# Patient Record
Sex: FEMALE | Race: WHITE | NOT HISPANIC OR LATINO | Employment: OTHER | URBAN - NONMETROPOLITAN AREA
[De-identification: names, ages, dates, MRNs, and addresses within clinical notes are randomized per-mention and may not be internally consistent; named-entity substitution may affect disease eponyms.]

---

## 2019-05-21 ENCOUNTER — OFFICE VISIT (OUTPATIENT)
Dept: CARDIOLOGY | Facility: CLINIC | Age: 79
End: 2019-05-21

## 2019-05-21 ENCOUNTER — LAB (OUTPATIENT)
Dept: LAB | Facility: HOSPITAL | Age: 79
End: 2019-05-21

## 2019-05-21 VITALS
BODY MASS INDEX: 24.56 KG/M2 | SYSTOLIC BLOOD PRESSURE: 135 MMHG | HEART RATE: 60 BPM | HEIGHT: 63 IN | DIASTOLIC BLOOD PRESSURE: 66 MMHG | OXYGEN SATURATION: 98 % | WEIGHT: 138.6 LBS

## 2019-05-21 DIAGNOSIS — R06.02 SHORTNESS OF BREATH: ICD-10-CM

## 2019-05-21 DIAGNOSIS — Z87.891 FORMER SMOKER: ICD-10-CM

## 2019-05-21 DIAGNOSIS — R07.2 PRECORDIAL PAIN: ICD-10-CM

## 2019-05-21 DIAGNOSIS — I47.29 NSVT (NONSUSTAINED VENTRICULAR TACHYCARDIA) (HCC): ICD-10-CM

## 2019-05-21 DIAGNOSIS — I10 ESSENTIAL HYPERTENSION: Primary | ICD-10-CM

## 2019-05-21 DIAGNOSIS — E78.2 MIXED HYPERLIPIDEMIA: ICD-10-CM

## 2019-05-21 DIAGNOSIS — R00.2 PALPITATIONS: ICD-10-CM

## 2019-05-21 DIAGNOSIS — R94.39 ABNORMAL NUCLEAR STRESS TEST: ICD-10-CM

## 2019-05-21 PROBLEM — K57.92 DIVERTICULITIS: Status: ACTIVE | Noted: 2019-05-21

## 2019-05-21 PROCEDURE — 85379 FIBRIN DEGRADATION QUANT: CPT | Performed by: INTERNAL MEDICINE

## 2019-05-21 PROCEDURE — 93000 ELECTROCARDIOGRAM COMPLETE: CPT | Performed by: INTERNAL MEDICINE

## 2019-05-21 PROCEDURE — 99204 OFFICE O/P NEW MOD 45 MIN: CPT | Performed by: INTERNAL MEDICINE

## 2019-05-21 PROCEDURE — 99406 BEHAV CHNG SMOKING 3-10 MIN: CPT | Performed by: INTERNAL MEDICINE

## 2019-05-21 PROCEDURE — 36415 COLL VENOUS BLD VENIPUNCTURE: CPT

## 2019-05-21 RX ORDER — TRIAMTERENE AND HYDROCHLOROTHIAZIDE 37.5; 25 MG/1; MG/1
1 TABLET ORAL DAILY
COMMUNITY
End: 2021-07-08

## 2019-05-21 RX ORDER — ASPIRIN 81 MG/1
81 TABLET ORAL DAILY
COMMUNITY
End: 2020-11-02

## 2019-05-21 RX ORDER — ATORVASTATIN CALCIUM 20 MG/1
20 TABLET, FILM COATED ORAL NIGHTLY
COMMUNITY

## 2019-05-21 RX ORDER — NITROGLYCERIN 0.4 MG/1
TABLET SUBLINGUAL
Qty: 25 TABLET | Refills: 2 | Status: SHIPPED | OUTPATIENT
Start: 2019-05-21 | End: 2021-05-03 | Stop reason: SDUPTHER

## 2019-05-21 NOTE — PATIENT INSTRUCTIONS
Steps to Quit Smoking  Smoking tobacco can be bad for your health. It can also affect almost every organ in your body. Smoking puts you and people around you at risk for many serious long-lasting (chronic) diseases. Quitting smoking is hard, but it is one of the best things that you can do for your health. It is never too late to quit.  What are the benefits of quitting smoking?  When you quit smoking, you lower your risk for getting serious diseases and conditions. They can include:  · Lung cancer or lung disease.  · Heart disease.  · Stroke.  · Heart attack.  · Not being able to have children (infertility).  · Weak bones (osteoporosis) and broken bones (fractures).    If you have coughing, wheezing, and shortness of breath, those symptoms may get better when you quit. You may also get sick less often. If you are pregnant, quitting smoking can help to lower your chances of having a baby of low birth weight.  What can I do to help me quit smoking?  Talk with your doctor about what can help you quit smoking. Some things you can do (strategies) include:  · Quitting smoking totally, instead of slowly cutting back how much you smoke over a period of time.  · Going to in-person counseling. You are more likely to quit if you go to many counseling sessions.  · Using resources and support systems, such as:  ? Online chats with a counselor.  ? Phone quitlines.  ? Printed self-help materials.  ? Support groups or group counseling.  ? Text messaging programs.  ? Mobile phone apps or applications.  · Taking medicines. Some of these medicines may have nicotine in them. If you are pregnant or breastfeeding, do not take any medicines to quit smoking unless your doctor says it is okay. Talk with your doctor about counseling or other things that can help you.    Talk with your doctor about using more than one strategy at the same time, such as taking medicines while you are also going to in-person counseling. This can help make  quitting easier.  What things can I do to make it easier to quit?  Quitting smoking might feel very hard at first, but there is a lot that you can do to make it easier. Take these steps:  · Talk to your family and friends. Ask them to support and encourage you.  · Call phone quitlines, reach out to support groups, or work with a counselor.  · Ask people who smoke to not smoke around you.  · Avoid places that make you want (trigger) to smoke, such as:  ? Bars.  ? Parties.  ? Smoke-break areas at work.  · Spend time with people who do not smoke.  · Lower the stress in your life. Stress can make you want to smoke. Try these things to help your stress:  ? Getting regular exercise.  ? Deep-breathing exercises.  ? Yoga.  ? Meditating.  ? Doing a body scan. To do this, close your eyes, focus on one area of your body at a time from head to toe, and notice which parts of your body are tense. Try to relax the muscles in those areas.  · Download or buy apps on your mobile phone or tablet that can help you stick to your quit plan. There are many free apps, such as QuitGuide from the CDC (Centers for Disease Control and Prevention). You can find more support from smokefree.gov and other websites.    This information is not intended to replace advice given to you by your health care provider. Make sure you discuss any questions you have with your health care provider.  Document Released: 10/14/2010 Document Revised: 08/15/2017 Document Reviewed: 05/03/2016  [employer] Interactive Patient Education © 2019 [employer]

## 2019-05-21 NOTE — PROGRESS NOTES
Subjective   Kaye Cano is a 78 y.o. female     Chief Complaint   Patient presents with   • Establish Care   • Chest Pain   • Shortness of Breath   • Palpitations       PROBLEM LIST:     1. Chest Pain  2. Shortness of breath  3. Palpitations  4. HTN  5. Hyperlipidemia  6. Diverticulitis  7. Former Smoker        Specialty Problems     None            HPI:  Ms. Kaye Cano is an 78-year-old female patient of [facility] who was seen today for evaluation of chest discomfort.    Ms. Cano describes chest pain of approximately 1 years duration.  She has a retrosternal and right greater than left precordial chest discomfort.  This sometimes starts in the right intrascapular area.  Chest discomfort usually occurs when the patient is lying down in the evening.  However, she has had chest discomfort in the morning and occasionally throughout the day.  Tightness lasted approximately 10 minutes, is associated with shortness of breath and rarely with diaphoresis occurs several times a month, and has not been progressive in frequency, intensity, or duration.  The patient states that she never has exertional chest discomfort.  As an example she describes push mowing her lawn and cleaning her gutters this weekend without shortness of breath or any type of chest discomfort.    Ms. Cano also denies orthopnea, PND, or lower extremity edema.  She has episodes of short-lived palpitations which are not temporally associated with chest discomfort.  Palpitations never cause lightheadedness, dizziness, or shortness of breath, have been present for years, and have not been progressive in nature.  The patient denies claudication or other symptoms of peripheral arterial disease she has no symptoms of arterial embolic events to include no symptoms of TIA or stroke.  The patient states that she had cardiac catheterization in the distant past and was told she had no coronary artery disease.                  CURRENT  MEDICATION:    Current Outpatient Medications   Medication Sig Dispense Refill   • aspirin 81 MG EC tablet Take 81 mg by mouth Daily.     • atorvastatin (LIPITOR) 10 MG tablet Take 10 mg by mouth Every Night.     • Misc Natural Products (OSTEO BI-FLEX JOINT SHIELD PO) Take  by mouth 2 (Two) Times a Day.     • triamterene-hydrochlorothiazide (MAXZIDE-25) 37.5-25 MG per tablet Take 1 tablet by mouth Daily.       No current facility-administered medications for this visit.        ALLERGIES:    Codeine    PAST MEDICAL HISTORY:    Past Medical History:   Diagnosis Date   • Hyperlipidemia        SURGICAL HISTORY:    Past Surgical History:   Procedure Laterality Date   • APPENDECTOMY     • BREAST LUMPECTOMY      Right    • CARDIAC CATHETERIZATION      10-12 years ago. No stents        SOCIAL HISTORY:    Social History     Socioeconomic History   • Marital status:      Spouse name: Not on file   • Number of children: Not on file   • Years of education: Not on file   • Highest education level: Not on file   Tobacco Use   • Smoking status: Former Smoker     Packs/day: 1.00     Years: 40.00     Pack years: 40.00     Types: Electronic Cigarette     Last attempt to quit: 2018     Years since quittin.8   • Smokeless tobacco: Never Used   • Tobacco comment: Still uses E-cigs    Substance and Sexual Activity   • Alcohol use: No     Frequency: Never   • Drug use: No   • Sexual activity: Defer       FAMILY HISTORY:    History reviewed. No pertinent family history.    Review of Systems   Constitutional: Positive for diaphoresis (night). Negative for chills, fatigue, fever and unexpected weight change.   HENT: Negative.    Eyes: Positive for visual disturbance (glasses daily ).   Respiratory: Positive for chest tightness (Occasional tightness in chest. Usually worse when laying down. Relieved by taking an ASA. ) and shortness of breath (At rest or with some exertion ).    Cardiovascular: Positive for palpitations  "(Occasional flutters ). Negative for chest pain and leg swelling.   Gastrointestinal: Positive for diarrhea. Negative for blood in stool (no melena,hematuria,hemoptysis,hematochezia).   Endocrine: Negative.  Negative for cold intolerance and heat intolerance.   Genitourinary: Negative.    Musculoskeletal: Positive for arthralgias (joints ). Negative for back pain.        Denies leg cramps with ambulation   Skin: Negative.  Negative for rash and wound.   Allergic/Immunologic: Negative.    Neurological: Negative.         Denies stroke like sx's   Hematological: Bruises/bleeds easily (bleeds easily ).   Psychiatric/Behavioral: Negative.  Negative for sleep disturbance (Denies waking with smothering or SOA).       VISIT VITALS:  Vitals:    05/21/19 1407   BP: 135/66   BP Location: Left arm   Patient Position: Sitting   Pulse: 60   SpO2: 98%   Weight: 62.9 kg (138 lb 9.6 oz)   Height: 160 cm (63\")      /66 (BP Location: Left arm, Patient Position: Sitting)   Pulse 60   Ht 160 cm (63\")   Wt 62.9 kg (138 lb 9.6 oz)   SpO2 98%   BMI 24.55 kg/m²     RECENT LABS:    Objective       Physical Exam   Constitutional: She is oriented to person, place, and time. She appears well-developed and well-nourished. No distress.   HENT:   Head: Normocephalic and atraumatic.   No oral lesions   Eyes: Conjunctivae and EOM are normal. Pupils are equal, round, and reactive to light.   No ptosis or lid lag  Extra-ocular motions intact  YEN   Neck: Normal range of motion. Neck supple. No hepatojugular reflux and no JVD present. Carotid bruit is not present. No tracheal deviation present.   Nl. Carotid upstrokes     Cardiovascular: Normal rate, regular rhythm, S1 normal, S2 normal and intact distal pulses. Exam reveals gallop and S4. Exam reveals no S3 and no friction rub.   No murmur heard.  Pulses:       Radial pulses are 2+ on the right side, and 2+ on the left side.   Pulmonary/Chest: Effort normal. She has no wheezes. She has " rhonchi in the right lower field. She has no rales.   Nl. Expir. Phase  Mildly increased Breath sound intensity     Abdominal: Soft. Bowel sounds are normal. She exhibits no distension, no abdominal bruit and no mass. There is no tenderness. There is no rebound and no guarding.   No organomegaly   Musculoskeletal: Normal range of motion. She exhibits no edema, tenderness or deformity.   BLE, no edema, nl. pedal pulses, nl. Cap. Refill     Neurological: She is alert and oriented to person, place, and time.   Skin: Skin is warm and dry. No rash noted. No erythema. No pallor.   Psychiatric: She has a normal mood and affect. Her behavior is normal. Judgment and thought content normal.   Nursing note and vitals reviewed.        ECG 12 Lead  Date/Time: 5/21/2019 3:40 PM  Performed by: Francis Weeks MD  Authorized by: Francis Weeks MD   Comments: Normal sinus rhythm.  Minor nonspecific ST segment changes.              Assessment/Plan   Umber 1.  Chest pain.  The patient describes chest tightness associated with shortness of breath and occasionally diaphoresis.  Symptoms are felt compatible with ischemia.  However, the patient can perform relatively high level physical activity without discomfort.  She is at moderate risk for coronary artery disease.  Therefore, we will utilize pharmacologic stress testing for diagnostic and prognostic, and potentially to direct therapy.    2.  Palpitations.  We will perform 30-day event monitor to exclude paroxysmal atrial fibrillation which would require full dose anticoagulation for stroke prophylaxis given a chads 2 vas 2 score 4.    3.  The patient will be given a prescription for sublingual nitroglycerin and instructions in its use.    4.  Dyslipidemia.  The patient's most recent fasting lipids were at goal..  We will make no changes.    5.  Baseline dyspnea.  The patient is a lifelong smoker and physical exam is compatible with mild COPD.  As the patient is minimally  symptomatic we will not address this and would defer to the patient's primary healthcare providers with regard to the need for evaluation and/or treatment.    6.  We will see the patient in follow-up after testing or on a as needed basis for symptoms, and she understands activate emergency medical services or report to the emergency room for any chest discomfort not rapidly relieved by nitroglycerin.  No diagnosis found.    No Follow-up on file.         I advised Kaye of the risks of continuing to use tobacco, and I provided her with tobacco cessation educational materials in the After Visit Summary.     During this visit, I spent >3 minutes counseling the patient regarding tobacco cessation.     Patient's Body mass index is 24.55 kg/m². BMI is within normal parameters. No follow-up required..       Va Cook LPN    Scribed for Dr. Francis Weeks by Va Cook LPN May 21, 2019 2:21 PM         Electronically signed by:            This note is dictated utilizing voice recognition software.  Although this record has been proof read, transcriptional errors may still be present. If questions occur regarding the content of this record please do not hesitate to call our office.

## 2019-05-22 ENCOUNTER — TELEPHONE (OUTPATIENT)
Dept: CARDIOLOGY | Facility: CLINIC | Age: 79
End: 2019-05-22

## 2019-05-22 LAB — D DIMER PPP FEU-MCNC: 0.83 MCGFEU/ML (ref 0–0.5)

## 2019-05-22 NOTE — TELEPHONE ENCOUNTER
PATIENT AWARE D-DIMER MINIMALLY ELEVATED BUT NO FURTHER EVAL. NEEDED. PH,LPN            ----- Message from Francis Weeks MD sent at 5/22/2019  2:05 PM EDT -----  No further eval. Needed.

## 2019-06-03 ENCOUNTER — TELEPHONE (OUTPATIENT)
Dept: CARDIOLOGY | Facility: CLINIC | Age: 79
End: 2019-06-03

## 2019-06-03 DIAGNOSIS — R06.02 SHORTNESS OF BREATH: ICD-10-CM

## 2019-06-03 DIAGNOSIS — R07.2 PRECORDIAL PAIN: ICD-10-CM

## 2019-06-03 DIAGNOSIS — I47.29 NSVT (NONSUSTAINED VENTRICULAR TACHYCARDIA) (HCC): Primary | ICD-10-CM

## 2019-06-03 NOTE — TELEPHONE ENCOUNTER
CALLED TO GET SYMPTOM CHECK ON PATIENT FROM AROUND 1:20 CT PER EVENT MONITOR EPISODE TODAY WITH 9 BEAT RUN OF V-TACH. PATIENT STATES [place]  [place]. DENIES ANY SYMPTOMS EXCEPT MAYBE SHORTNESS OF BREATH, BUT NOT SURE. DENIES FEELING ANY RACING PALPS ETC. V/O PER DANIELLE MARQUEZ, PAC TO HAVE BMP AND MAG LEVEL DRAWN. PATIENT AWARE, STATES WILL GO TO [facility] IN AM AND GET DRAWN. ORDER FAXED. PROSPER,LPN

## 2019-06-06 ENCOUNTER — TELEPHONE (OUTPATIENT)
Dept: CARDIOLOGY | Facility: CLINIC | Age: 79
End: 2019-06-06

## 2019-06-06 NOTE — TELEPHONE ENCOUNTER
PATIENT AWARE MAG AND K+ LEVELS WNL. VERBALIZED OK. PH,LPN        ----- Message from Tonya Butt sent at 6/6/2019  9:44 AM EDT -----   Pt would like her lab results from Tuesday if they are available.

## 2019-06-17 ENCOUNTER — HOSPITAL ENCOUNTER (OUTPATIENT)
Dept: CARDIOLOGY | Facility: HOSPITAL | Age: 79
Discharge: HOME | End: 2019-06-17

## 2019-06-17 DIAGNOSIS — Z87.891 FORMER SMOKER: ICD-10-CM

## 2019-06-17 DIAGNOSIS — R00.2 PALPITATIONS: ICD-10-CM

## 2019-06-17 DIAGNOSIS — I10 ESSENTIAL HYPERTENSION: ICD-10-CM

## 2019-06-17 DIAGNOSIS — R07.2 PRECORDIAL PAIN: ICD-10-CM

## 2019-06-17 DIAGNOSIS — R06.02 SHORTNESS OF BREATH: ICD-10-CM

## 2019-06-17 DIAGNOSIS — E78.2 MIXED HYPERLIPIDEMIA: ICD-10-CM

## 2019-06-17 LAB
BH CV STRESS COMMENTS STAGE 1: NORMAL
BH CV STRESS DOSE REGADENOSON STAGE 1: 0.4
BH CV STRESS DURATION MIN STAGE 1: 0
BH CV STRESS DURATION SEC STAGE 1: 10
BH CV STRESS PROTOCOL 1: NORMAL
BH CV STRESS RECOVERY BP: NORMAL MMHG
BH CV STRESS RECOVERY HR: 64 BPM
BH CV STRESS STAGE 1: 1
MAXIMAL PREDICTED HEART RATE: 140 BPM
PERCENT MAX PREDICTED HR: 55 %
STRESS BASELINE BP: NORMAL MMHG
STRESS BASELINE HR: 47 BPM
STRESS PERCENT HR: 65 %
STRESS POST PEAK BP: NORMAL MMHG
STRESS POST PEAK HR: 77 BPM
STRESS TARGET HR: 119 BPM

## 2019-06-17 PROCEDURE — 93018 CV STRESS TEST I&R ONLY: CPT | Performed by: INTERNAL MEDICINE

## 2019-06-17 PROCEDURE — 78452 HT MUSCLE IMAGE SPECT MULT: CPT

## 2019-06-17 PROCEDURE — A9500 TC99M SESTAMIBI: HCPCS | Performed by: INTERNAL MEDICINE

## 2019-06-17 PROCEDURE — 93306 TTE W/DOPPLER COMPLETE: CPT

## 2019-06-17 PROCEDURE — 93017 CV STRESS TEST TRACING ONLY: CPT

## 2019-06-17 PROCEDURE — 0 TECHNETIUM SESTAMIBI: Performed by: INTERNAL MEDICINE

## 2019-06-17 PROCEDURE — 25010000002 REGADENOSON 0.4 MG/5ML SOLUTION: Performed by: INTERNAL MEDICINE

## 2019-06-17 PROCEDURE — 93306 TTE W/DOPPLER COMPLETE: CPT | Performed by: INTERNAL MEDICINE

## 2019-06-17 PROCEDURE — 78452 HT MUSCLE IMAGE SPECT MULT: CPT | Performed by: INTERNAL MEDICINE

## 2019-06-17 RX ADMIN — TECHNETIUM TC 99M SESTAMIBI 1 DOSE: 1 INJECTION INTRAVENOUS at 09:14

## 2019-06-17 RX ADMIN — TECHNETIUM TC 99M SESTAMIBI 1 DOSE: 1 INJECTION INTRAVENOUS at 11:28

## 2019-06-17 RX ADMIN — REGADENOSON 0.4 MG: 0.08 INJECTION, SOLUTION INTRAVENOUS at 11:28

## 2019-06-18 ENCOUNTER — TELEPHONE (OUTPATIENT)
Dept: CARDIOLOGY | Facility: CLINIC | Age: 79
End: 2019-06-18

## 2019-06-18 NOTE — TELEPHONE ENCOUNTER
PATIENT AWARE STRESS TEST ABNL, AND NEEDS TO GET HER IN FOR SOONER APPT. MOI VELÁZQUEZ AWARE TO GET IN WITHIN 1-2 WEEKS. JENNA CHENG        ----- Message from Francis Weeks MD sent at 6/18/2019  8:47 AM EDT -----  1-2 week f/u.

## 2019-06-24 LAB
BH CV ECHO MEAS - ACS: 2 CM
BH CV ECHO MEAS - AO MEAN PG: 4.1 MMHG
BH CV ECHO MEAS - AO ROOT AREA (BSA CORRECTED): 1.7
BH CV ECHO MEAS - AO ROOT AREA: 6.4 CM^2
BH CV ECHO MEAS - AO ROOT DIAM: 2.8 CM
BH CV ECHO MEAS - AO V2 MEAN: 93.9 CM/SEC
BH CV ECHO MEAS - AO V2 VTI: 30.7 CM
BH CV ECHO MEAS - BSA(HAYCOCK): 1.7 M^2
BH CV ECHO MEAS - BSA: 1.7 M^2
BH CV ECHO MEAS - BZI_BMI: 24.4 KILOGRAMS/M^2
BH CV ECHO MEAS - BZI_METRIC_HEIGHT: 160 CM
BH CV ECHO MEAS - BZI_METRIC_WEIGHT: 62.6 KG
BH CV ECHO MEAS - EDV(CUBED): 50.2 ML
BH CV ECHO MEAS - EDV(MOD-SP4): 47 ML
BH CV ECHO MEAS - EDV(TEICH): 57.7 ML
BH CV ECHO MEAS - EF(CUBED): 76.5 %
BH CV ECHO MEAS - EF(MOD-SP4): 40.4 %
BH CV ECHO MEAS - EF(TEICH): 69.4 %
BH CV ECHO MEAS - ESV(CUBED): 11.8 ML
BH CV ECHO MEAS - ESV(MOD-SP4): 28 ML
BH CV ECHO MEAS - ESV(TEICH): 17.7 ML
BH CV ECHO MEAS - FS: 38.3 %
BH CV ECHO MEAS - IVS/LVPW: 0.98
BH CV ECHO MEAS - IVSD: 1.1 CM
BH CV ECHO MEAS - LA DIMENSION: 3.5 CM
BH CV ECHO MEAS - LA/AO: 1.2
BH CV ECHO MEAS - LV DIASTOLIC VOL/BSA (35-75): 28.5 ML/M^2
BH CV ECHO MEAS - LV IVRT: 0.13 SEC
BH CV ECHO MEAS - LV MASS(C)D: 137.6 GRAMS
BH CV ECHO MEAS - LV MASS(C)DI: 83.3 GRAMS/M^2
BH CV ECHO MEAS - LV SYSTOLIC VOL/BSA (12-30): 17 ML/M^2
BH CV ECHO MEAS - LVIDD: 3.7 CM
BH CV ECHO MEAS - LVIDS: 2.3 CM
BH CV ECHO MEAS - LVLD AP4: 6.7 CM
BH CV ECHO MEAS - LVLS AP4: 6.2 CM
BH CV ECHO MEAS - LVOT AREA (M): 2.5 CM^2
BH CV ECHO MEAS - LVOT AREA: 2.4 CM^2
BH CV ECHO MEAS - LVOT DIAM: 1.8 CM
BH CV ECHO MEAS - LVPWD: 1.2 CM
BH CV ECHO MEAS - MV A MAX VEL: 74.5 CM/SEC
BH CV ECHO MEAS - MV DEC SLOPE: 290 CM/SEC^2
BH CV ECHO MEAS - MV E MAX VEL: 65.6 CM/SEC
BH CV ECHO MEAS - MV E/A: 0.88
BH CV ECHO MEAS - RVDD: 2.8 CM
BH CV ECHO MEAS - SI(AO): 118.5 ML/M^2
BH CV ECHO MEAS - SI(CUBED): 23.3 ML/M^2
BH CV ECHO MEAS - SI(MOD-SP4): 11.5 ML/M^2
BH CV ECHO MEAS - SI(TEICH): 24.3 ML/M^2
BH CV ECHO MEAS - SV(AO): 195.7 ML
BH CV ECHO MEAS - SV(CUBED): 38.4 ML
BH CV ECHO MEAS - SV(MOD-SP4): 19 ML
BH CV ECHO MEAS - SV(TEICH): 40.1 ML
MAXIMAL PREDICTED HEART RATE: 140 BPM
STRESS TARGET HR: 119 BPM

## 2019-06-25 ENCOUNTER — OFFICE VISIT (OUTPATIENT)
Dept: CARDIOLOGY | Facility: CLINIC | Age: 79
End: 2019-06-25

## 2019-06-25 VITALS
WEIGHT: 136.8 LBS | BODY MASS INDEX: 24.24 KG/M2 | HEART RATE: 64 BPM | HEIGHT: 63 IN | OXYGEN SATURATION: 97 % | DIASTOLIC BLOOD PRESSURE: 64 MMHG | SYSTOLIC BLOOD PRESSURE: 128 MMHG

## 2019-06-25 DIAGNOSIS — I47.29 NSVT (NONSUSTAINED VENTRICULAR TACHYCARDIA) (HCC): ICD-10-CM

## 2019-06-25 DIAGNOSIS — R06.02 SHORTNESS OF BREATH: ICD-10-CM

## 2019-06-25 DIAGNOSIS — R94.39 ABNORMAL NUCLEAR STRESS TEST: ICD-10-CM

## 2019-06-25 DIAGNOSIS — R00.2 PALPITATIONS: ICD-10-CM

## 2019-06-25 DIAGNOSIS — I10 ESSENTIAL HYPERTENSION: ICD-10-CM

## 2019-06-25 DIAGNOSIS — R07.2 PRECORDIAL PAIN: Primary | ICD-10-CM

## 2019-06-25 PROCEDURE — 99214 OFFICE O/P EST MOD 30 MIN: CPT | Performed by: PHYSICIAN ASSISTANT

## 2019-06-25 RX ORDER — CLOPIDOGREL BISULFATE 75 MG/1
75 TABLET ORAL DAILY
Qty: 30 TABLET | Refills: 11 | Status: SHIPPED | OUTPATIENT
Start: 2019-06-25 | End: 2019-07-30

## 2019-06-25 NOTE — PATIENT INSTRUCTIONS
Coronary Angiogram With Stent  Coronary angiogram with stent placement is a procedure to widen or open a narrow blood vessel of the heart (coronary artery). Arteries may become blocked by cholesterol buildup (plaques) in the lining of the wall. When a coronary artery becomes partially blocked, blood flow to that area decreases. This may lead to chest pain or a heart attack (myocardial infarction).  A stent is a small piece of metal that looks like mesh or a spring. Stent placement may be done as treatment for a heart attack or right after a coronary angiogram in which a blocked artery is found.  Let your health care provider know about:  · Any allergies you have.  · All medicines you are taking, including vitamins, herbs, eye drops, creams, and over-the-counter medicines.  · Any problems you or family members have had with anesthetic medicines.  · Any blood disorders you have.  · Any surgeries you have had.  · Any medical conditions you have.  · Whether you are pregnant or may be pregnant.  What are the risks?  Generally, this is a safe procedure. However, problems may occur, including:  · Damage to the heart or its blood vessels.  · A return of blockage.  · Bleeding, infection, or bruising at the insertion site.  · A collection of blood under the skin (hematoma) at the insertion site.  · A blood clot in another part of the body.  · Kidney injury.  · Allergic reaction to the dye or contrast that is used.  · Bleeding into the abdomen (retroperitoneal bleeding).    What happens before the procedure?  Staying hydrated  Follow instructions from your health care provider about hydration, which may include:  · Up to 2 hours before the procedure - you may continue to drink clear liquids, such as water, clear fruit juice, black coffee, and plain tea.    Eating and drinking restrictions  Follow instructions from your health care provider about eating and drinking, which may include:  · 8 hours before the procedure - stop  eating heavy meals or foods such as meat, fried foods, or fatty foods.  · 6 hours before the procedure - stop eating light meals or foods, such as toast or cereal.  · 2 hours before the procedure - stop drinking clear liquids.    Ask your health care provider about:  · Changing or stopping your regular medicines. This is especially important if you are taking diabetes medicines or blood thinners.  · Taking medicines such as ibuprofen. These medicines can thin your blood. Do not take these medicines before your procedure if your health care provider instructs you not to. Generally, aspirin is recommended before a procedure of passing a small, thin tube (catheter) through a blood vessel and into the heart (cardiac catheterization).    What happens during the procedure?  · An IV tube will be inserted into one of your veins.  · You will be given one or more of the following:  ? A medicine to help you relax (sedative).  ? A medicine to numb the area where the catheter will be inserted into an artery (local anesthetic).  · To reduce your risk of infection:  ? Your health care team will wash or sanitize their hands.  ? Your skin will be washed with soap.  ? Hair may be removed from the area where the catheter will be inserted.  · Using a guide wire, the catheter will be inserted into an artery. The location may be in your groin, in your wrist, or in the fold of your arm (near your elbow).  · A type of X-ray (fluoroscopy) will be used to help guide the catheter to the opening of the arteries in the heart.  · A dye will be injected into the catheter, and X-rays will be taken. The dye will help to show where any narrowing or blockages are located in the arteries.  · A tiny wire will be guided to the blocked spot, and a balloon will be inflated to make the artery wider.  · The stent will be expanded and will crush the plaques into the wall of the vessel. The stent will hold the area open and improve the blood flow. Most stents  have a drug coating to reduce the risk of the stent narrowing over time.  · The artery may be made wider using a drill, laser, or other tools to remove plaques.  · When the blood flow is better, the catheter will be removed. The lining of the artery will grow over the stent, which stays where it was placed.  This procedure may vary among health care providers and hospitals.  What happens after the procedure?  · If the procedure is done through the leg, you will be kept in bed lying flat for about 6 hours. You will be instructed to not bend and not cross your legs.  · The insertion site will be checked frequently.  · The pulse in your foot or wrist will be checked frequently.  · You may have additional blood tests, X-rays, and a test that records the electrical activity of your heart (electrocardiogram, or ECG).  This information is not intended to replace advice given to you by your health care provider. Make sure you discuss any questions you have with your health care provider.  Document Released: 06/23/2004 Document Revised: 08/17/2017 Document Reviewed: 07/23/2017  [employer] Interactive Patient Education © 2019 [employer]

## 2019-06-25 NOTE — PROGRESS NOTES
Problem list     Subjective   Kaye Cano is a 78 y.o. female     Chief Complaint   Patient presents with   • Hypertension   • Palpitations   • Shortness of Breath       HPI  The patient presents in today to review stress test and echo findings.  She also is currently wearing an event monitor given previously described palpitations.  The patient's previous cardiac history includes minor disease by catheterization a number of years ago.  We had seen her back to reestablish care given recurrent episodes of chest discomfort and shortness of air described at last evaluation.  She was scheduled for testing at that time.  Her nuclear stress test suggested anterior wall ischemia.  Echo suggested preserved systolic function with no significant valvular issues.  She is still wearing her monitor.  Thus far, I have been able to pull some of the telemetry from that.  On day 7, she had nonsustained V. tach noted.    Symptomatically, the patient continues to have chest discomfort.  She describes chest tightness which typically occurs at rest.  She has started noticing this with exertion however.  Her dyspnea has certainly worsened however over the past several weeks.  She has no PND orthopnea.  She reports no significant dizziness.  She has had no syncopal episodes.  She has no further complaints otherwise.    Current Outpatient Medications   Medication Sig Dispense Refill   • aspirin 81 MG EC tablet Take 81 mg by mouth Daily.     • atorvastatin (LIPITOR) 10 MG tablet Take 10 mg by mouth Every Night.     • Misc Natural Products (OSTEO BI-FLEX JOINT SHIELD PO) Take  by mouth 2 (Two) Times a Day.     • nitroglycerin (NITROSTAT) 0.4 MG SL tablet 1 under the tongue as needed for angina, may repeat q5mins for up three doses within 15 minutes 25 tablet 2   • triamterene-hydrochlorothiazide (MAXZIDE-25) 37.5-25 MG per tablet Take 1 tablet by mouth Daily.     • clopidogrel (PLAVIX) 75 MG tablet Take 1 tablet by mouth Daily. 30 tablet  11     No current facility-administered medications for this visit.        Codeine    Past Medical History:   Diagnosis Date   • Diverticulitis    • Hyperlipidemia    • Hypertension        Social History     Socioeconomic History   • Marital status:      Spouse name: Not on file   • Number of children: Not on file   • Years of education: Not on file   • Highest education level: Not on file   Tobacco Use   • Smoking status: Former Smoker     Packs/day: 1.00     Years: 40.00     Pack years: 40.00     Types: Electronic Cigarette     Last attempt to quit: 2018     Years since quittin.9   • Smokeless tobacco: Never Used   • Tobacco comment: Still uses E-cigs    Substance and Sexual Activity   • Alcohol use: No     Frequency: Never   • Drug use: No   • Sexual activity: Defer       History reviewed. No pertinent family history.    Review of Systems   Constitutional: Positive for fatigue (Some fatigue ). Negative for chills and fever.   HENT: Negative.  Negative for congestion, rhinorrhea and sore throat.    Eyes: Negative.  Negative for visual disturbance.   Respiratory: Positive for shortness of breath (SOA with exertion ). Negative for chest tightness.    Cardiovascular: Negative.  Negative for chest pain, palpitations and leg swelling.   Gastrointestinal: Negative.  Negative for abdominal pain, blood in stool, nausea and vomiting.   Endocrine: Negative.  Negative for cold intolerance and heat intolerance.   Genitourinary: Negative.  Negative for dysuria, frequency, hematuria and urgency.   Musculoskeletal: Negative.  Negative for arthralgias and back pain.   Skin: Negative.  Negative for rash and wound.   Allergic/Immunologic: Negative for environmental allergies and food allergies.   Neurological: Negative.  Negative for dizziness, weakness and light-headedness.   Hematological: Bruises/bleeds easily (Bruises and bleeds easily ).   Psychiatric/Behavioral: Negative.  Negative for agitation, confusion and  "sleep disturbance (Denies waking with smothering or SOA). The patient is not nervous/anxious.        Objective   Vitals:    06/25/19 0840   BP: 128/64   BP Location: Left arm   Patient Position: Sitting   Pulse: 64   SpO2: 97%   Weight: 62.1 kg (136 lb 12.8 oz)   Height: 160 cm (63\")      /64 (BP Location: Left arm, Patient Position: Sitting)   Pulse 64   Ht 160 cm (63\")   Wt 62.1 kg (136 lb 12.8 oz)   SpO2 97%   BMI 24.23 kg/m²    Lab Results (most recent)     None        Physical Exam   Constitutional: She is oriented to person, place, and time. She appears well-developed and well-nourished. No distress.   HENT:   Head: Normocephalic and atraumatic.   Eyes: Conjunctivae and EOM are normal. Pupils are equal, round, and reactive to light.   Neck: Normal range of motion. Neck supple. No JVD present. No tracheal deviation present.   Cardiovascular: Normal rate, regular rhythm, normal heart sounds and intact distal pulses.   Pulmonary/Chest: Effort normal and breath sounds normal.   Abdominal: Soft. Bowel sounds are normal. She exhibits no distension and no mass. There is no tenderness. There is no rebound and no guarding.   Musculoskeletal: Normal range of motion. She exhibits no edema, tenderness or deformity.   Neurological: She is alert and oriented to person, place, and time.   Skin: Skin is warm and dry. No rash noted. No erythema. No pallor.   Psychiatric: She has a normal mood and affect. Her behavior is normal. Judgment and thought content normal.   Nursing note and vitals reviewed.        Procedure   Procedures       Assessment/Plan      Diagnosis Plan   1. Precordial pain  [facility]    CBC & Differential    Basic Metabolic Panel   2. Shortness of breath  [facility]    CBC & Differential    Basic Metabolic Panel   3. Essential hypertension  [facility]    CBC & Differential    Basic Metabolic Panel   4. Abnormal nuclear stress test  [facility]    CBC & " Differential    Basic Metabolic Panel   5. NSVT (nonsustained ventricular tachycardia) (CMS/HCC)       1.  The patient has ongoing symptoms of chest discomfort and shortness of air, numerous risk factors for CAD, and an abnormal stress test.  Additionally, she has nonsustained V. tach noted by her event monitor.  I feel she needs further evaluation of her coronary anatomy given the above scenario.  She will be scheduled for catheterization.    2.  We will schedule for laboratories in the precath setting including CBC and BMP.    3.  I will add Plavix to her medical regimen.  She will continue aspirin and statin therapy.  Ideally, I would consider beta-blocker therapy given all the above.  I am concerned with blood pressures at this time and will hold for that at this time.    4.  We can recommend further once we can see results of catheterization.  For complications prior to follow-up, the patient will call immediately.             Patient's Body mass index is 24.23 kg/m². BMI is within normal parameters. No follow-up required..             Electronically signed by:

## 2019-06-26 ENCOUNTER — LAB (OUTPATIENT)
Dept: LAB | Facility: HOSPITAL | Age: 79
End: 2019-06-26

## 2019-06-26 DIAGNOSIS — R07.2 PRECORDIAL PAIN: ICD-10-CM

## 2019-06-26 DIAGNOSIS — I10 ESSENTIAL HYPERTENSION: ICD-10-CM

## 2019-06-26 DIAGNOSIS — R06.02 SHORTNESS OF BREATH: ICD-10-CM

## 2019-06-26 DIAGNOSIS — R94.39 ABNORMAL NUCLEAR STRESS TEST: ICD-10-CM

## 2019-06-26 LAB
ANION GAP SERPL CALCULATED.3IONS-SCNC: 16.3 MMOL/L (ref 5–15)
BASOPHILS # BLD AUTO: 0.05 10*3/MM3 (ref 0–0.2)
BASOPHILS NFR BLD AUTO: 0.6 % (ref 0–1.5)
BUN BLD-MCNC: 21 MG/DL (ref 8–23)
BUN/CREAT SERPL: 18.3 (ref 7–25)
CALCIUM SPEC-SCNC: 10.5 MG/DL (ref 8.6–10.5)
CHLORIDE SERPL-SCNC: 106 MMOL/L (ref 98–107)
CO2 SERPL-SCNC: 22.7 MMOL/L (ref 22–29)
CREAT BLD-MCNC: 1.15 MG/DL (ref 0.57–1)
DEPRECATED RDW RBC AUTO: 45.1 FL (ref 37–54)
EOSINOPHIL # BLD AUTO: 0.4 10*3/MM3 (ref 0–0.4)
EOSINOPHIL NFR BLD AUTO: 4.5 % (ref 0.3–6.2)
ERYTHROCYTE [DISTWIDTH] IN BLOOD BY AUTOMATED COUNT: 14.3 % (ref 12.3–15.4)
GFR SERPL CREATININE-BSD FRML MDRD: 45 ML/MIN/1.73
GLUCOSE BLD-MCNC: 121 MG/DL (ref 65–99)
HCT VFR BLD AUTO: 40.7 % (ref 34–46.6)
HGB BLD-MCNC: 12.9 G/DL (ref 12–15.9)
IMM GRANULOCYTES # BLD AUTO: 0.02 10*3/MM3 (ref 0–0.05)
IMM GRANULOCYTES NFR BLD AUTO: 0.2 % (ref 0–0.5)
LYMPHOCYTES # BLD AUTO: 2.06 10*3/MM3 (ref 0.7–3.1)
LYMPHOCYTES NFR BLD AUTO: 23.1 % (ref 19.6–45.3)
MCH RBC QN AUTO: 28.4 PG (ref 26.6–33)
MCHC RBC AUTO-ENTMCNC: 31.7 G/DL (ref 31.5–35.7)
MCV RBC AUTO: 89.5 FL (ref 79–97)
MONOCYTES # BLD AUTO: 0.77 10*3/MM3 (ref 0.1–0.9)
MONOCYTES NFR BLD AUTO: 8.6 % (ref 5–12)
NEUTROPHILS # BLD AUTO: 5.63 10*3/MM3 (ref 1.7–7)
NEUTROPHILS NFR BLD AUTO: 63 % (ref 42.7–76)
PLATELET # BLD AUTO: 321 10*3/MM3 (ref 140–450)
PMV BLD AUTO: 9.3 FL (ref 6–12)
POTASSIUM BLD-SCNC: 3.5 MMOL/L (ref 3.5–5.2)
RBC # BLD AUTO: 4.55 10*6/MM3 (ref 3.77–5.28)
SODIUM BLD-SCNC: 145 MMOL/L (ref 136–145)
WBC NRBC COR # BLD: 8.93 10*3/MM3 (ref 3.4–10.8)

## 2019-06-26 PROCEDURE — 85025 COMPLETE CBC W/AUTO DIFF WBC: CPT | Performed by: PHYSICIAN ASSISTANT

## 2019-06-26 PROCEDURE — 80048 BASIC METABOLIC PNL TOTAL CA: CPT | Performed by: PHYSICIAN ASSISTANT

## 2019-06-26 PROCEDURE — 36415 COLL VENOUS BLD VENIPUNCTURE: CPT

## 2019-07-01 ENCOUNTER — TELEPHONE (OUTPATIENT)
Dept: CARDIOLOGY | Facility: CLINIC | Age: 79
End: 2019-07-01

## 2019-07-01 DIAGNOSIS — K92.1 BLOOD IN STOOL: Primary | ICD-10-CM

## 2019-07-01 NOTE — TELEPHONE ENCOUNTER
Confirmed w/ Tonya that referral to GI is needed, then cath will need to be R/S.      Informed pt of the above, she verbalized understanding that we will R/S cath once we hear from GI.

## 2019-07-01 NOTE — TELEPHONE ENCOUNTER
----- Message from SAUL Schulz sent at 7/1/2019 11:49 AM EDT -----  No I have never seen this patient.  Dr. Tolbert or  unless she has a preference.   ----- Message -----  From: Mariah Gonzalez  Sent: 7/1/2019  11:27 AM  To: SAUL Schulz     Have a preference of GI?   ----- Message -----  From: Tonya Butt  Sent: 7/1/2019  11:14 AM  To: Mariah Restrepo presented to  for her [facility] today and has had some bleeding from her bowels. The hospital spoke with Dr. Weeks, who said to cx her cath and get her in with a GI and to see us soon.

## 2019-07-16 ENCOUNTER — TELEPHONE (OUTPATIENT)
Dept: CARDIOLOGY | Facility: CLINIC | Age: 79
End: 2019-07-16

## 2019-07-16 NOTE — TELEPHONE ENCOUNTER
Patient has been notified of appt date and time for new LHC. Patient is going to come by office so we can go over instructions again. Patient verbalized understanding and had no further questions at this time. -WILLY;BOB

## 2019-07-24 ENCOUNTER — TELEPHONE (OUTPATIENT)
Dept: CARDIOLOGY | Facility: CLINIC | Age: 79
End: 2019-07-24

## 2019-07-24 NOTE — TELEPHONE ENCOUNTER
----- Message from Lien Vallecillo MA sent at 7/24/2019 11:41 AM EDT -----  Please call  ----- Message -----  From: Kaley Neal RegSched Rep  Sent: 7/23/2019   1:36 PM  To: OWEN Herzog    PLEASE CALL PT SPOKE WITH DANIELLE Saturday IN ER, PT STATED SHE WAS TO CALL YOU YESTERDAY WAS NOT RELEASED FROM HOSPITAL.  PT CAN BE REACHED AT  [phone]    Comments     Message sent to front office staff to scheduled with Dr.Cameron VAN per Oumar Schafer PA-C. -;[facility]    07/24/2019: no answer @ 1449 PM . Patient scheduled to see  on 07/30/2019 @ 1045 AM. -;[facility]

## 2019-07-26 ENCOUNTER — TELEPHONE (OUTPATIENT)
Dept: CARDIOLOGY | Facility: CLINIC | Age: 79
End: 2019-07-26

## 2019-07-26 NOTE — TELEPHONE ENCOUNTER
----- Message from Lien Vallecillo MA sent at 7/24/2019 11:41 AM EDT -----  Please call  ----- Message -----  From: Kaley Neal RegSched Rep  Sent: 7/23/2019   1:36 PM  To: OWEN Herzog    PLEASE CALL PT SPOKE WITH DANIELLE Saturday IN ER, PT STATED SHE WAS TO CALL YOU YESTERDAY WAS NOT RELEASED FROM HOSPITAL.  PT CAN BE REACHED AT  [phone]

## 2019-07-26 NOTE — TELEPHONE ENCOUNTER
Comments     Message sent to front office staff to scheduled with Dr.Cameron VAN per Oumar Schafer PA-C. -;[facility]    07/24/2019: no answer @ 1449 PM . Patient scheduled to see  on 07/30/2019 @ 1045 AM. -;[facility]    NO answer @ 0528 PM . -;[facility]    PLEASE CALL DAUGHTER     Received: Today     Message Contents     Addie Bro RegSched Rep Hignite, Bridget R, MA    PT WAS EXPECTING A CALL BACK BUT HER DAUGHTER TOOK HER TO THE ER TUES NIGHT & PT WAS ADMITTED. DAUGHTER REQUEST FOR YOU TO PLEASE CALL HER CELL PHONE [phone]

## 2019-07-26 NOTE — TELEPHONE ENCOUNTER
Called and spoke with patients daughter, Paola. Stated her mother was in [facility] again for GI bleed and will be having EGD/colonoscopy again today. notified her to please keep me updated on her condition and we will keep [facility] date and time in place for now until further notice. -WILLY;BOB

## 2019-07-30 ENCOUNTER — OFFICE VISIT (OUTPATIENT)
Dept: CARDIOLOGY | Facility: CLINIC | Age: 79
End: 2019-07-30

## 2019-07-30 ENCOUNTER — TELEPHONE (OUTPATIENT)
Dept: CARDIOLOGY | Facility: CLINIC | Age: 79
End: 2019-07-30

## 2019-07-30 VITALS
HEART RATE: 69 BPM | WEIGHT: 135.8 LBS | OXYGEN SATURATION: 98 % | SYSTOLIC BLOOD PRESSURE: 125 MMHG | BODY MASS INDEX: 24.06 KG/M2 | HEIGHT: 63 IN | DIASTOLIC BLOOD PRESSURE: 75 MMHG

## 2019-07-30 DIAGNOSIS — R94.39 ABNORMAL STRESS TEST: ICD-10-CM

## 2019-07-30 DIAGNOSIS — I10 ESSENTIAL HYPERTENSION: ICD-10-CM

## 2019-07-30 DIAGNOSIS — E78.2 MIXED HYPERLIPIDEMIA: ICD-10-CM

## 2019-07-30 DIAGNOSIS — R00.2 PALPITATIONS: ICD-10-CM

## 2019-07-30 DIAGNOSIS — R06.02 SHORTNESS OF BREATH: ICD-10-CM

## 2019-07-30 DIAGNOSIS — I47.29 VENTRICULAR TACHYCARDIA (PAROXYSMAL) (HCC): ICD-10-CM

## 2019-07-30 DIAGNOSIS — R07.2 PRECORDIAL PAIN: Primary | ICD-10-CM

## 2019-07-30 PROCEDURE — 99213 OFFICE O/P EST LOW 20 MIN: CPT | Performed by: NURSE PRACTITIONER

## 2019-07-30 RX ORDER — ISOSORBIDE DINITRATE 10 MG/1
10 TABLET ORAL 2 TIMES DAILY
COMMUNITY
End: 2019-11-01

## 2019-07-30 NOTE — PROGRESS NOTES
Subjective   Kaye Cano is a 78 y.o. female     Chief Complaint   Patient presents with   • Hypertension   • Rapid Heart Rate   • Palpitations       HPI    Problem List:    1. Chest Pain  1.1 stress test 6/17/19-anterior wall ischemia, post-rest EF 59%, marked uptake of radiopharmaceutical in the subdiaphragmatic region both post stress and at rest  2.  Palpitations  2.1 event monitor 5/28-6/26/19-9 beat run of nonsustained V. tach, PACs, PVCs  3.  Hypertension  4.  Hyperlipidemia  5.  Diverticulitis  6.  AVMs of the bowel  6.1 EGD/colonoscopy 7/23/19-2 duodenal AVMs ablated, 2 gastric AVMs ablated, 2: AVMs ablated, mild left-sided diverticulosis  7.  Shortness of breath  7.1 echo 7/24/19-extra systolic beats, EF 60 to 65%, mild MR, mild TR    Patient is an 78-year-old female who presents today for follow-up after being in the hospital for rectal bleeding.  She states she has had 4 episodes of midsternal chest tightness that she has taken nitroglycerin for 3 of the 4 occasions.  She says that one woke her up in the middle the night.  She says that she did become short of breath when it occurred.  Denies any other symptoms.  She denies any palpitations, fluttering, dizziness, presyncope, syncope, orthopnea or PND.  She says when her blood levels are low she does get lightheaded.  She says that she does get swelling in her legs but typically goes down overnight.  She has been short of breath and fatigued much more for the past month.  She says walking for very little distance she will get very short of breath.  Patient did go to the ER for chest pain in the did prescribe her some isosorbide.    Patient goes tomorrow for the PillCam.  She hopes to be able to know by the end of this week if she should be able to proceed with her heart cath she has scheduled.  If she is able to we will not start her on Plavix until after she has the cath if stents are to be placed.  I did discuss this with Dr. Randhawa and he is in  agreement.    Current Outpatient Medications   Medication Sig Dispense Refill   • aspirin 81 MG EC tablet Take 81 mg by mouth Daily.     • atorvastatin (LIPITOR) 10 MG tablet Take 10 mg by mouth Every Night.     • IRON PO Take  by mouth.     • isosorbide dinitrate (ISORDIL) 10 MG tablet Take 10 mg by mouth 2 (Two) Times a Day.     • Misc Natural Products (OSTEO BI-FLEX JOINT SHIELD PO) Take  by mouth 2 (Two) Times a Day.     • nitroglycerin (NITROSTAT) 0.4 MG SL tablet 1 under the tongue as needed for angina, may repeat q5mins for up three doses within 15 minutes 25 tablet 2   • triamterene-hydrochlorothiazide (MAXZIDE-25) 37.5-25 MG per tablet Take 1 tablet by mouth Daily.       No current facility-administered medications for this visit.        ALLERGIES    Codeine and Plavix [clopidogrel]    Past Medical History:   Diagnosis Date   • Diverticulitis    • Hyperlipidemia    • Hypertension        Social History     Socioeconomic History   • Marital status:      Spouse name: Not on file   • Number of children: Not on file   • Years of education: Not on file   • Highest education level: Not on file   Tobacco Use   • Smoking status: Former Smoker     Packs/day: 1.00     Years: 40.00     Pack years: 40.00     Types: Electronic Cigarette     Last attempt to quit: 2018     Years since quittin.0   • Smokeless tobacco: Never Used   • Tobacco comment: Still uses E-cigs    Substance and Sexual Activity   • Alcohol use: No     Frequency: Never   • Drug use: No   • Sexual activity: Defer       History reviewed. No pertinent family history.    Review of Systems   Constitutional: Positive for diaphoresis (sweats at night, denies it being excessive amounts of sweat ) and fatigue (In the last month; just driving from one place to another ).   HENT: Negative for congestion, rhinorrhea and sore throat.    Eyes: Negative for visual disturbance.   Respiratory: Positive for chest tightness and shortness of breath (w/ normal  "daily activities x 1 month; just walking to her car from the lobby).    Cardiovascular: Positive for chest pain (States she was woken up one night recently w/ dull CP. Pt states it was in the middle of her chest \"hurt/tight\" took a nitro: c/o increased SoA, non-radiating, didn't get hot and sweaty  ) and leg swelling (BLE edema, goes down over night ). Negative for palpitations.   Gastrointestinal: Positive for blood in stool (Plavix was causing blood in stool, stopped Plavix x 2 weeks ago. States her BMs are black from the iron pill, unable to see if still blood in stool. ) and diarrhea. Negative for abdominal pain, constipation, nausea and vomiting.   Endocrine: Positive for cold intolerance (States she's been really  cold lately ). Negative for heat intolerance.   Genitourinary: Negative for difficulty urinating, dysuria, frequency, hematuria and urgency.   Musculoskeletal: Negative for back pain and neck pain.   Allergic/Immunologic: Negative for environmental allergies.   Neurological: Positive for weakness (Sat and went to hospital, came home Tues and went back that night as she felt so bad ) and light-headedness (some times when low on blood ). Negative for dizziness and syncope.   Hematological: Bruises/bleeds easily.        Pt stopped taking her Plavix 2 weeks ago, as it was causing blood in her BMs       Objective   /75 (BP Location: Left arm, Patient Position: Sitting)   Pulse 69   Ht 160 cm (63\")   Wt 61.6 kg (135 lb 12.8 oz)   SpO2 98%   BMI 24.06 kg/m²   Vitals:    07/30/19 1134   BP: 125/75   BP Location: Left arm   Patient Position: Sitting   Pulse: 69   SpO2: 98%   Weight: 61.6 kg (135 lb 12.8 oz)   Height: 160 cm (63\")      Lab Results (most recent)     None        Physical Exam   Constitutional: She is oriented to person, place, and time. Vital signs are normal. She appears well-developed and well-nourished. She is active and cooperative.   HENT:   Head: Normocephalic.   Mouth/Throat: " She has dentures (upper and lower ).   Eyes: Lids are normal.   Neck: Normal carotid pulses, no hepatojugular reflux and no JVD present. Carotid bruit is not present.   Cardiovascular: Normal rate, regular rhythm and normal heart sounds.   Pulses:       Radial pulses are 2+ on the right side, and 2+ on the left side.        Dorsalis pedis pulses are 2+ on the right side, and 2+ on the left side.        Posterior tibial pulses are 2+ on the right side, and 2+ on the left side.   No edema BLE.   Pulmonary/Chest: Effort normal and breath sounds normal.   Abdominal: Normal appearance and bowel sounds are normal.   Neurological: She is alert and oriented to person, place, and time.   Skin: Skin is warm, dry and intact.   Psychiatric: She has a normal mood and affect. Her speech is normal and behavior is normal. Judgment and thought content normal. Cognition and memory are normal.       Procedure   Procedures         Assessment/Plan      Diagnosis Plan   1. Precordial pain     2. Essential hypertension     3. Mixed hyperlipidemia     4. Palpitations     5. Shortness of breath     6. Abnormal stress test     7. Ventricular tachycardia (paroxysmal) (CMS/HCC)         Return follow-up after [facility] .    Hypertension-patient is doing very well on Maxide.  Hyperlipidemia-patient is on Lipitor.  Palpitations-stable.  Shortness of breath/abnormal stress test/chest pain/V. tach-patient has left heart cath pending.  She will use nitro IA for chest pain no resolution she will go to the ER.  She will follow-up after the heart cath or sooner if any changes.  She will follow-up with Dr. Larkin's office for clearance for her to proceed with heart cath in case she is to need dual antiplatelet at that time.      Nereyda call patient's pharmacy and confirm she is on the isosorbide therefore I did not start her on the medication.    Kaye Cano is a current electronic cigarettes user.  She currently vapes 0.5 vials per day for a duration of 1  years. I have educated her on the risk of diseases from using tobacco products such as cancer, COPD and heart diease.     I advised her to quit and she is not willing to quit.    I spent 3  minutes counseling the patient.        Patient's Body mass index is 24.06 kg/m². BMI is within normal parameters. No follow-up required..      Electronically signed by:

## 2019-07-30 NOTE — PATIENT INSTRUCTIONS
What You Need to Know About Electronic Cigarettes  Electronic cigarettes, or e-cigarettes, are battery-operated devices that deliver nicotine to your body. They come in many shapes, including in the shape of a cigarette, pipe, pen, and even a USB memory stick. E-cigarettes have a cartridge that contains a liquid form of nicotine. When you use the device, the liquid heats up. It then becomes a vapor. Inhaling this vapor is called vaping.  While e-cigarettes do not contain tar and the same cancer-causing chemicals that are in tobacco cigarettes, they may contain other harmful and cancer-causing chemicals, such as formaldehyde or acetaldehyde. Nicotine is thought to increase your risk for certain types of cancer. Many e-cigarettes have chemical colorings and flavorings. It is not clear how much nicotine you get when vaping. The health effects of vaping are not completely known.  Some people may use e-cigarettes in order to quit smoking tobacco. However, this has not been proven to work, and the Food and Drug Administration (FDA) has not approved e-cigarettes for this purpose.  How can using electronic cigarettes affect me?  · E-cigarettes contain nicotine, which is a very addictive drug. Vaping may make you crave nicotine. Nicotine:  ? Changes your blood sugar levels.  ? Increases your heart rate, blood pressure, and breathing rate.  ? Increases your risk of developing blood clots (hypercoaguable state) and diabetes.  · If you smoke e-cigarettes, you may be more likely to start smoking or to smoke more tobacco cigarettes.  · Becoming addicted to nicotine may make your brain more sensitive to other addictive drugs. You may move to other addictive substances.  · If you are pregnant, the nicotine in e-cigarettes may be harmful to your baby. Nicotine can cause:  ? Brain or lung problems for your baby.  ? Your baby to be born too early.  ? Your baby to be born with a low birth weight.  · If you are a child or a teen, vaping  may affect your memory or lower your attention span.  · You may be in danger of overdosing on nicotine. Nicotine poisoning can cause nausea, vomiting, seizures, and trouble breathing.  What are the benefits of stopping vaping?  If you stop vaping, you can avoid:  · Getting addicted to nicotine.  · Having nicotine side effects.  · Getting nicotine poisoning.  · Being exposed to dangerous chemicals.  · Increasing your risk of health problems.  · Increasing your baby's risk of health problems, if you are pregnant.  · Being more likely to use other addictive substances.    What steps can I take to stop vaping?  If you can stop vaping on your own, do it before you become addicted to nicotine. If you need help stopping, ask your health care provider. There are three effective ways to fight nicotine addiction:  · Nicotine replacement therapy. Using nicotine gum or a nicotine patch blocks your craving for nicotine. Over time, you can reduce the amount of nicotine you use until you can stop using nicotine completely without having cravings.  · Prescription medicines approved to fight nicotine addiction. These stop nicotine cravings or block the effects of nicotine.  · Behavioral therapy. This may include:  ? A self-help smoking cessation program.  ? Individual or group therapy.  ? A smoking cessation support group.    Where can I get support?  You can get support at these sites:  · U.S. National Library of Medicine: https://medlineplus.gov/ency/article/869492.htm  · U.S. Department of Health and Human Services: https://smokefree.gov  · American Lung Association: http://www.lung.org/stop-smoking/e-qfxl-ph-quit/how-to-quit-smoking.html    Where can I get more information?  Learn more about e-cigarettes from:  · U.S. National Institutes of Health: https://www.drugabuse.gov/publications/drugfacts/yxpwwmxrgo-cbrxdrxsdb-h-cigarettes  · U.S. Department of Health and Human Services:  http://betobaccofree.hhs.gov/about-tobacco/Electronic-Cigarettes    Summary  · E-cigarettes can cause nicotine addiction.  · E-cigarettes are not approved as a way to stop smoking.  · E-cigarettes are not a risk-free alternative to smoking tobacco.  · There are ways to fight nicotine addiction.  · Talk to your health care provider if you are unable to stop vaping on your own.  This information is not intended to replace advice given to you by your health care provider. Make sure you discuss any questions you have with your health care provider.  Document Released: 04/10/2017 Document Revised: 09/06/2017 Document Reviewed: 12/09/2016  [employer] Interactive Patient Education © 2019 [employer]    Coronary Angiogram With Stent  Coronary angiogram with stent placement is a procedure to widen or open a narrow blood vessel of the heart (coronary artery). Arteries may become blocked by cholesterol buildup (plaques) in the lining of the wall. When a coronary artery becomes partially blocked, blood flow to that area decreases. This may lead to chest pain or a heart attack (myocardial infarction).  A stent is a small piece of metal that looks like mesh or a spring. Stent placement may be done as treatment for a heart attack or right after a coronary angiogram in which a blocked artery is found.  Let your health care provider know about:  · Any allergies you have.  · All medicines you are taking, including vitamins, herbs, eye drops, creams, and over-the-counter medicines.  · Any problems you or family members have had with anesthetic medicines.  · Any blood disorders you have.  · Any surgeries you have had.  · Any medical conditions you have.  · Whether you are pregnant or may be pregnant.  What are the risks?  Generally, this is a safe procedure. However, problems may occur, including:  · Damage to the heart or its blood vessels.  · A return of blockage.  · Bleeding, infection, or bruising at the insertion site.  · A  collection of blood under the skin (hematoma) at the insertion site.  · A blood clot in another part of the body.  · Kidney injury.  · Allergic reaction to the dye or contrast that is used.  · Bleeding into the abdomen (retroperitoneal bleeding).    What happens before the procedure?  Staying hydrated  Follow instructions from your health care provider about hydration, which may include:  · Up to 2 hours before the procedure - you may continue to drink clear liquids, such as water, clear fruit juice, black coffee, and plain tea.    Eating and drinking restrictions  Follow instructions from your health care provider about eating and drinking, which may include:  · 8 hours before the procedure - stop eating heavy meals or foods such as meat, fried foods, or fatty foods.  · 6 hours before the procedure - stop eating light meals or foods, such as toast or cereal.  · 2 hours before the procedure - stop drinking clear liquids.    Ask your health care provider about:  · Changing or stopping your regular medicines. This is especially important if you are taking diabetes medicines or blood thinners.  · Taking medicines such as ibuprofen. These medicines can thin your blood. Do not take these medicines before your procedure if your health care provider instructs you not to. Generally, aspirin is recommended before a procedure of passing a small, thin tube (catheter) through a blood vessel and into the heart (cardiac catheterization).    What happens during the procedure?  · An IV tube will be inserted into one of your veins.  · You will be given one or more of the following:  ? A medicine to help you relax (sedative).  ? A medicine to numb the area where the catheter will be inserted into an artery (local anesthetic).  · To reduce your risk of infection:  ? Your health care team will wash or sanitize their hands.  ? Your skin will be washed with soap.  ? Hair may be removed from the area where the catheter will be  inserted.  · Using a guide wire, the catheter will be inserted into an artery. The location may be in your groin, in your wrist, or in the fold of your arm (near your elbow).  · A type of X-ray (fluoroscopy) will be used to help guide the catheter to the opening of the arteries in the heart.  · A dye will be injected into the catheter, and X-rays will be taken. The dye will help to show where any narrowing or blockages are located in the arteries.  · A tiny wire will be guided to the blocked spot, and a balloon will be inflated to make the artery wider.  · The stent will be expanded and will crush the plaques into the wall of the vessel. The stent will hold the area open and improve the blood flow. Most stents have a drug coating to reduce the risk of the stent narrowing over time.  · The artery may be made wider using a drill, laser, or other tools to remove plaques.  · When the blood flow is better, the catheter will be removed. The lining of the artery will grow over the stent, which stays where it was placed.  This procedure may vary among health care providers and hospitals.  What happens after the procedure?  · If the procedure is done through the leg, you will be kept in bed lying flat for about 6 hours. You will be instructed to not bend and not cross your legs.  · The insertion site will be checked frequently.  · The pulse in your foot or wrist will be checked frequently.  · You may have additional blood tests, X-rays, and a test that records the electrical activity of your heart (electrocardiogram, or ECG).  This information is not intended to replace advice given to you by your health care provider. Make sure you discuss any questions you have with your health care provider.  Document Released: 06/23/2004 Document Revised: 08/17/2017 Document Reviewed: 07/23/2017  Elsevier Interactive Patient Education © 2019 [employer].

## 2019-08-01 ENCOUNTER — TELEPHONE (OUTPATIENT)
Dept: CARDIOLOGY | Facility: CLINIC | Age: 79
End: 2019-08-01

## 2019-08-01 NOTE — TELEPHONE ENCOUNTER
Per SAUL Lai call to pharmacy to see if patient currently on Imdur 10 mg one by mouth daily.  She picked up script in July.  JOSE,BUCK

## 2019-08-19 ENCOUNTER — TELEPHONE (OUTPATIENT)
Dept: CARDIOLOGY | Facility: CLINIC | Age: 79
End: 2019-08-19

## 2019-08-19 NOTE — TELEPHONE ENCOUNTER
Called and spoke with patient, notified her that we are still awaiting clearance letter from [facility] . If we do not have this by end of day on 08/20/2019 we will have to cancel and re-schedule her LHC. I have reach out to their office numerous times as well and spoke with Bijal , stated it is on the desk of SAUL Piedra to review. Bijal was made aware as well. -;JANEA

## 2019-08-20 ENCOUNTER — TELEPHONE (OUTPATIENT)
Dept: CARDIOLOGY | Facility: CLINIC | Age: 79
End: 2019-08-20

## 2019-08-20 NOTE — TELEPHONE ENCOUNTER
----- Message from Juan Araya sent at 8/19/2019  4:05 PM EDT -----  [phone] PLEASE CALL PT TO DISCUSS PAPERWORK AT DR HINTON OFFICE, PT CAME IN OFFICE TO SPEAK WITH YOU. PT STATED DR SANTIAGO WAS ON CALL LETTER WAS ON HIS DESK THAT HE MIGHT BE ABLE TO GET TO IT THIS WEEK.

## 2019-08-20 NOTE — TELEPHONE ENCOUNTER
Letter of GI recommendation was received in office from Dr. Mayur Waggoner. This was reviewed by Oumar Schafer PA-C. Verbal orders given at this time to cancel C scheduled for 08/23/2019 until clear from GI standpoint.  Called patient and notified her as well, patient was notified to please contact our office once she is cleared form GI standpoint. Patient verbalized understanding and had no further questions at this time. Stated she would call once she knows something from GI services. -;BOB

## 2019-09-05 ENCOUNTER — TELEPHONE (OUTPATIENT)
Dept: CARDIOLOGY | Facility: CLINIC | Age: 79
End: 2019-09-05

## 2019-09-06 NOTE — TELEPHONE ENCOUNTER
----- Message from Juan Lloyd sent at 9/3/2019  2:22 PM EDT -----  Regarding: HEART CATH  PT HAS BEEN HAVING PROBLEMS WITH RECTAL BLEEDING AND HAVING TO PUT HER HEART CATH OFF. SHE WANTED JALEN TO CALL HER. SAID SHE HAD COLONOSCOPY TODAY AND SHE WILL BE OK TO PROCEED IN A COUPLE OF WEEKS.  [phone]  Comments     Patient saw Dr.Todd Waggoner yesterday for follow up . Will call and retreive GI clearance if aval. -WILLY;BOB    Clearance letter re-faxed today 09/04/2019 from our office. -WILLY;BOB        
Call was received in office from patients granddaughter, Maria M who left  on nurses line to return call that she was a RN at [facility] and she did not understanding why her grandmother was having to wait to do LHC. After review of chart , patients granddaughter is not listed on HIPAA form.   I called patient and made sure she was aware and understanding as to why we could not proceed with LHC until she was cleared from GI, also notified her we had spoke with SAUL Piedra today and I made her aware what provider had requested at this time. Patient verbalized understanding and had no further questions , I notified her that granddaughter that had called office was not listed. Patient reported HR last PM was 65, today it has been 52,58, and 56. Patient was notified to call me in AM and let me know if she is having any symptoms, this will be discussed with provider and patient will be notified if any verbal orders were given at this time I would call and notify her. -WILLY;BOB  
Patient called this AM to notify our office of HR readings = 52,53,56. Provider was notified, medications and recent monitor results were reviewed as well. No new verbal orders given at this time. Patient notified to continue monitoring HR , report if abnormally low. To call once GI has released her to proceed with [facility] , patient verbalized understanding and had no further questions at this time. -WILLY;BOB  
SAUL Piedra called regarding patient and GI clearance req that we faxed over. Provider stated patient underwent repeat scope on 09/03/2019, at that time Dr. Waggoner felt she was stable however they would like for patient to complete a couple weeks worth of CBC's to assure she is no longer anemic before they clear her for LHC. Provider Oumar Schafer PA-C notified, at this time we will await until GI clearance has been received before patient is again scheduled for LHC. -;[facility]  
I have personally seen and examined this patient.  I have fully participated in the care of this patient. I have reviewed all pertinent clinical information, including history, physical exam, plan and the Resident’s note and agree except as noted.

## 2019-09-16 ENCOUNTER — TELEPHONE (OUTPATIENT)
Dept: CARDIOLOGY | Facility: CLINIC | Age: 79
End: 2019-09-16

## 2019-09-16 NOTE — TELEPHONE ENCOUNTER
Called [facility], requested they send updated labs. Also called patient as well and notified her. -WILLY;BOB

## 2019-09-16 NOTE — TELEPHONE ENCOUNTER
----- Message from Juan Lloyd sent at 9/13/2019  8:30 AM EDT -----  Regarding: BLOOD WORK RESULTS FROM DR SANTIAGO  PT WANTS YOU TO TRY AND GET HER BLOOD RESULTS FROM DR SANTIAGO. @ [phone]. STATES WE ARE NEEDING TO [facility]ART CATH BUT NEED RESULTS AND SHE IS NOT ABLE TO REACH ANYONE AT HIS OFFICE TO CALL HER BACK. HER CALL BACK -[phone]

## 2019-09-20 ENCOUNTER — TELEPHONE (OUTPATIENT)
Dept: CARDIOLOGY | Facility: CLINIC | Age: 79
End: 2019-09-20

## 2019-09-20 NOTE — TELEPHONE ENCOUNTER
----- Message from Juan Patel sent at 9/20/2019  9:35 AM EDT -----  PT CALLED WANTING TO KNOW ABOUT HEART CATH SCHEDULE

## 2019-09-20 NOTE — TELEPHONE ENCOUNTER
Called patient as well and notified her that we are awaiting clearance from GI, soon as that is received we will get her scheduled for [facility]. Patient verbalized understanding and had no further questions at this time. -WILLY;BOB

## 2019-09-20 NOTE — TELEPHONE ENCOUNTER
Called and left message at [facility] requesting they send updated clearance on patient if can proceed with C. -BH;BOB

## 2019-09-23 NOTE — TELEPHONE ENCOUNTER
Called [facility] and req GI clearance be faxed to office prior to LHC. -BH;BOB

## 2019-11-01 ENCOUNTER — OFFICE VISIT (OUTPATIENT)
Dept: CARDIOLOGY | Facility: CLINIC | Age: 79
End: 2019-11-01

## 2019-11-01 VITALS
SYSTOLIC BLOOD PRESSURE: 130 MMHG | HEART RATE: 67 BPM | BODY MASS INDEX: 24.63 KG/M2 | DIASTOLIC BLOOD PRESSURE: 69 MMHG | HEIGHT: 63 IN | WEIGHT: 139 LBS | OXYGEN SATURATION: 99 %

## 2019-11-01 DIAGNOSIS — R06.02 SHORTNESS OF BREATH: Primary | ICD-10-CM

## 2019-11-01 DIAGNOSIS — I47.29 VENTRICULAR TACHYCARDIA (PAROXYSMAL) (HCC): ICD-10-CM

## 2019-11-01 DIAGNOSIS — R53.83 FATIGUE, UNSPECIFIED TYPE: ICD-10-CM

## 2019-11-01 PROCEDURE — 99212 OFFICE O/P EST SF 10 MIN: CPT | Performed by: NURSE PRACTITIONER

## 2019-11-01 NOTE — PATIENT INSTRUCTIONS
"Fat and Cholesterol Restricted Eating Plan  Getting too much fat and cholesterol in your diet may cause health problems. Choosing the right foods helps keep your fat and cholesterol at normal levels. This can keep you from getting certain diseases.  Your doctor may recommend an eating plan that includes:  · Total fat: ______% or less of total calories a day.  · Saturated fat: ______% or less of total calories a day.  · Cholesterol: less than _________mg a day.  · Fiber: ______g a day.  What are tips for following this plan?  Meal planning  · At meals, divide your plate into four equal parts:  ? Fill one-half of your plate with vegetables and green salads.  ? Fill one-fourth of your plate with whole grains.  ? Fill one-fourth of your plate with low-fat (lean) protein foods.  · Eat fish that is high in omega-3 fats at least two times a week. This includes mackerel, tuna, sardines, and salmon.  · Eat foods that are high in fiber, such as whole grains, beans, apples, broccoli, carrots, peas, and barley.  General tips    · Work with your doctor to lose weight if you need to.  · Avoid:  ? Foods with added sugar.  ? Fried foods.  ? Foods with partially hydrogenated oils.  · Limit alcohol intake to no more than 1 drink a day for nonpregnant women and 2 drinks a day for men. One drink equals 12 oz of beer, 5 oz of wine, or 1½ oz of hard liquor.  Reading food labels  · Check food labels for:  ? Trans fats.  ? Partially hydrogenated oils.  ? Saturated fat (g) in each serving.  ? Cholesterol (mg) in each serving.  ? Fiber (g) in each serving.  · Choose foods with healthy fats, such as:  ? Monounsaturated fats.  ? Polyunsaturated fats.  ? Omega-3 fats.  · Choose grain products that have whole grains. Look for the word \"whole\" as the first word in the ingredient list.  Cooking  · Cook foods using low-fat methods. These include baking, boiling, grilling, and broiling.  · Eat more home-cooked foods. Eat at restaurants and buffets " less often.  · Avoid cooking using saturated fats, such as butter, cream, palm oil, palm kernel oil, and coconut oil.  Recommended foods    Fruits  · All fresh, canned (in natural juice), or frozen fruits.  Vegetables  · Fresh or frozen vegetables (raw, steamed, roasted, or grilled). Green salads.  Grains  · Whole grains, such as whole wheat or whole grain breads, crackers, cereals, and pasta. Unsweetened oatmeal, bulgur, barley, quinoa, or brown rice. Corn or whole wheat flour tortillas.  Meats and other protein foods  · Ground beef (85% or leaner), grass-fed beef, or beef trimmed of fat. Skinless chicken or turkey. Ground chicken or turkey. Pork trimmed of fat. All fish and seafood. Egg whites. Dried beans, peas, or lentils. Unsalted nuts or seeds. Unsalted canned beans. Nut butters without added sugar or oil.  Dairy  · Low-fat or nonfat dairy products, such as skim or 1% milk, 2% or reduced-fat cheeses, low-fat and fat-free ricotta or cottage cheese, or plain low-fat and nonfat yogurt.  Fats and oils  · Tub margarine without trans fats. Light or reduced-fat mayonnaise and salad dressings. Avocado. Olive, canola, sesame, or safflower oils.  The items listed above may not be a complete list of foods and beverages you can eat. Contact a dietitian for more information.  Foods to avoid  Fruits  · Canned fruit in heavy syrup. Fruit in cream or butter sauce. Fried fruit.  Vegetables  · Vegetables cooked in cheese, cream, or butter sauce. Fried vegetables.  Grains  · White bread. White pasta. White rice. Cornbread. Bagels, pastries, and croissants. Crackers and snack foods that contain trans fat and hydrogenated oils.  Meats and other protein foods  · Fatty cuts of meat. Ribs, chicken wings, arias, sausage, bologna, salami, chitterlings, fatback, hot dogs, bratwurst, and packaged lunch meats. Liver and organ meats. Whole eggs and egg yolks. Chicken and turkey with skin. Fried meat.  Dairy  · Whole or 2% milk, cream,  half-and-half, and cream cheese. Whole milk cheeses. Whole-fat or sweetened yogurt. Full-fat cheeses. Nondairy creamers and whipped toppings. Processed cheese, cheese spreads, and cheese curds.  Beverages  · Alcohol. Sugar-sweetened drinks such as sodas, lemonade, and fruit drinks.  Fats and oils  · Butter, stick margarine, lard, shortening, ghee, or arias fat. Coconut, palm kernel, and palm oils.  Sweets and desserts  · Corn syrup, sugars, honey, and molasses. Candy. Jam and jelly. Syrup. Sweetened cereals. Cookies, pies, cakes, donuts, muffins, and ice cream.  The items listed above may not be a complete list of foods and beverages you should avoid. Contact a dietitian for more information.  Summary  · Choosing the right foods helps keep your fat and cholesterol at normal levels. This can keep you from getting certain diseases.  · At meals, fill one-half of your plate with vegetables and green salads.  · Eat high-fiber foods, like whole grains, beans, apples, carrots, peas, and barley.  · Limit added sugar, saturated fats, alcohol, and fried foods.  This information is not intended to replace advice given to you by your health care provider. Make sure you discuss any questions you have with your health care provider.  Document Released: 06/18/2013 Document Revised: 08/21/2019 Document Reviewed: 09/04/2018  [employer] Interactive Patient Education © 2019 [employer]    Ventricular Tachycardia    Ventricular tachycardia is a fast heartbeat that begins in the lower chambers of the heart (ventricles). It is a type of abnormal heart rhythm (arrhythmia). A normal heartbeat usually starts when an area in the heart called the sinoatrial (SA) node releases an electrical signal. With ventricular tachycardia, electrical signals in the lower part of the heart fire abnormally and interfere with the electrical signals sent out by the SA node.  A normal heart rate is  beats per minute. During an episode of ventricular  tachycardia, the heart reaches 100 beats per minute or higher. This condition can be life-threatening and should be treated immediately.  What are the causes?  This condition is caused by abnormal electrical activity in the lower part of the heart. This may result from:  · Medicines.  · Diseases of the heart muscle (cardiomyopathy).  · The heart not getting enough oxygen. This may be caused by blood flow problems in the arteries.  · An inflammatory disease that affects multiple areas of the body (sarcoidosis).  · Drug use, such as cocaine, amphetamine, or anabolic steroid use.  What increases the risk?  You are more likely to develop this condition if:  · You have had a heart attack.  · You have:  ? Heart failure or cardiomyopathy.  ? Heart defects that you were born with (congenital heart defects).  ? Abnormal heart tissue.  ? Heart valves that leak or are narrow.  ? Diabetes.  ? An infection that affects the heart.  ? High blood pressure.  ? An overactive or underactive thyroid.  ? Sleep apnea.  ? A family history of stopped heartbeat (cardiac arrest) or coronary artery disease.  ? High cholesterol.  · You smoke.  · You drink alcohol heavily.  · You use drugs, such as cocaine.  What are the signs or symptoms?  Symptoms of this condition include:  · A pounding heartbeat.  · Feeling as if your heart is skipping beats or fluttering (palpitations).  · Shortness of breath.  · Anxiety.  · Dizziness.  · Light-headedness.  · Fainting.  · Chest pain.  · Cardiac arrest caused by an irregular heartbeat (arrhythmia).  How is this diagnosed?  This condition may be diagnosed based on:  · Your symptoms and medical history.  · A physical exam.  · Electrocardiogram (ECG). This test is done to check for problems with electrical activity in the heart.  · Holter monitor or event monitor test. This test involves wearing a portable device that monitors your heart rate over time.  You may also have other tests, including:  · Blood  tests.  · Chest X-ray.  · Echocardiogram. This test involves using sound waves to create images of the heart.  · Angiogram. During this test, dye is injected into your bloodstream, and then X-rays are taken. The dye lets your health care provider see how blood flows through your arteries.  · Exercise stress test. During this test, an ECG is done while you exercise on a treadmill.  · Cardiac CT scan or cardiac MRI.  How is this treated?  Treatment for this condition depends on the cause. Treatment may include:  · Medicines that slow the heart rate and return it to a normal rhythm (anti-arrhythmics).  · An electric shock (cardioversion) that makes the heart go back to a normal rhythm.  · An electrophysiology study. This procedure can help locate areas of heart tissue that are causing rapid heartbeats.  ? In this procedure, a thin, flexible tube (catheter) is inserted into one of your veins and moved to your heart to evaluate your heart's electrical activity.  ? In some cases, the heart tissue that is causing problems may be killed with radiofrequency energy delivered through the catheter (radiofrequency ablation). This may help your heart keep a normal rhythm.  · An implantable cardioverter defibrillator (ICD). This is a small device that monitors your heartbeat. When it senses an irregular heartbeat, it sends a shock to bring the heartbeat back to normal. The ICD is implanted under the skin in the chest.  · Surgery to improve blood flow to the heart.  · Genetic counseling to check whether your family members are at risk for ventricular tachycardia.  Follow these instructions at home:  Lifestyle  · Do not use any products that contain nicotine or tobacco, such as cigarettes and e-cigarettes. If you need help quitting, ask your health care provider.  · Do not use stimulant drugs, such as cocaine or methamphetamines.  · Maintain a healthy weight.  · Manage stress. Try to do this with relaxation exercises, yoga, quiet  time, or meditation.  Eating and drinking  · Eat a healthy diet. This includes plenty of fruits and vegetables, whole grains, lean meats, and low-fat or fat-free dairy products.  · Avoid eating foods that are high in saturated fat, trans fat, sugar, or salt (sodium).  · Ask your health care provider if you may drink alcohol.  ? If alcohol triggers episodes of ventricular tachycardia, do not drink alcohol.  ? If alcohol does not trigger episodes, limit alcohol intake to no more than 1 drink a day for nonpregnant women and 2 drinks a day for men. One drink equals 12 oz of beer, 5 oz of wine, or 1½ oz of hard liquor.  General instructions  · Take over-the-counter and prescription medicines only as told by your health care provider.  · Exercise regularly. Aim for 150 minutes of moderate exercise or 75 minutes of vigorous exercise per week. Ask your health care provider what exercises are safe for you.  · Keep all follow-up visits as told by your health care provider. This is important.  Contact a health care provider if:  · Your symptoms get worse.  · You develop new symptoms, such as new palpitations.  · You feel depressed.  Get help right away if:  · You have an episode of ventricular tachycardia that lasts 30 seconds or more.  · You have chest pain.  · You have trouble breathing.  These symptoms may represent a serious problem that is an emergency. Do not wait to see if the symptoms will go away. Get medical help right away. Call your local emergency services (911 in the [place] Do not drive yourself to the hospital.  Summary  · Ventricular tachycardia is a fast heartbeat that begins in the lower chambers of the heart. This condition can be life-threatening and should be treated immediately.  · This condition may be treated with medicines, electric shock, radiofrequency energy, surgery, or insertion of an implantable cardioverter defibrillator (ICD).  · Get help right away if you have chest pain, trouble breathing, or  ventricular tachycardia symptoms that last more than 30 seconds.  This information is not intended to replace advice given to you by your health care provider. Make sure you discuss any questions you have with your health care provider.  Document Released: 02/08/2018 Document Revised: 02/08/2018 Document Reviewed: 02/08/2018  [employer] Interactive Patient Education © 2019 [employer]    Acute Coronary Syndrome    Acute coronary syndrome (ACS) is a serious problem in which there is suddenly not enough blood and oxygen reaching the heart. ACS can result in chest pain or a heart attack.  This condition is a medical emergency. If you have any symptoms of this condition, get help right away.  What are the causes?  This condition may be caused by:  · Buildup of fat and cholesterol inside of the arteries (atherosclerosis). This is the most common cause. The buildup (plaque) can cause blood vessels in the heart (coronary arteries) to become narrow or blocked, which reduces blood flow to the heart. Plaque can also break off and lead to a clot, which can block an artery and cause a heart attack or stroke.  · Sudden tightening of the muscles around the coronary arteries (coronary spasm).  · Tearing of a coronary artery (spontaneous coronary artery dissection).  · Very low blood pressure (hypotension).  · An abnormal heartbeat (arrhythmia).  · Other medical conditions that cause a decrease of oxygen to the heart, such as anemiaorrespiratory failure.  · Using cocaine or methamphetamine.  What increases the risk?  The following factors may make you more likely to develop this condition:  · Age. The risk for ACS increases as you get older.  · History of chest pain, heart attack, peripheral artery disease, or stroke.  · Having taken chemotherapy or immune-suppressing medicines.  · Being male.  · Family history of chest pain, heart disease, or stroke.  · Smoking.  · Not exercising enough.  · Being overweight.  · High  cholesterol.  · High blood pressure (hypertension).  · Diabetes.  · Excessive alcohol use.  What are the signs or symptoms?  Common symptoms of this condition include:  · Chest pain. The pain may last a long time, or it may stop and come back (recur). It may feel like:  ? Crushing or squeezing.  ? Tightness, pressure, fullness, or heaviness.  · Arm, neck, jaw, or back pain.  · Heartburn or indigestion.  · Shortness of breath.  · Nausea.  · Sudden cold sweats.  · Light-headedness.  · Dizziness, or passing out.  · Tiredness (fatigue).  Sometimes there are no symptoms.  How is this diagnosed?  This condition may be diagnosed based on:  · Your medical history and symptoms.  · An electrocardiogram (ECG). This imaging test measures the heart's electrical activity.  · Blood tests. Cardiac blood tests may need to be repeated at designated time intervals.  · Chest X-ray.  · A CT scan of the chest.  · A coronary angiogram. This is a procedure in which dye is injected into the bloodstream and then X-rays are taken to show if there is a blockage in a coronary artery.  · Exercise stress testing.  · Echocardiography. This is a test that uses sound waves to produce detailed images of the heart.  How is this treated?  The treatment is to restore blood flow to the heart as soon as possible. Treatment for this condition may include:  · Oxygen therapy.  · Medicines, such as:  ? Antiplatelet medicines and blood-thinning medicines, such as aspirin. These help prevent blood clots.  ? Medicine that dissolves any blood clots (fibrinolytic therapy).  ? Blood pressure medicines.  ? Nitroglycerin. This helps relieve chest pain and widens blood vessels to improve blood flow.  ? Pain medicine.  ? Cholesterol-lowering medicine.  · Surgery, such as:  ? Coronary angioplasty with stent placement. This involves placing a small piece of metal that looks like mesh or a spring into a narrow coronary artery. This widens the artery and keep it  open.  ? Coronary artery bypass surgery. This involves taking a section of a blood vessel from a different part of your body, and placing it on the blocked coronary artery to allow blood to flow around (bypass) the blockage.  · Cardiac rehabilitation. This is a program that helps improve your health and well-being. It includes exercise training, education, and counseling to help you recover.  Follow these instructions at home:  Eating and drinking  · Eat a heart-healthy diet that includes whole grains, fruits and vegetables, lean proteins, and low-fat or nonfat dairy products.  · Limit how much salt (sodium) you eat as told by your health care provider. Follow instructions from your health care provider about any other eating or drinking restrictions, such as limiting foods that are high in fat and processed sugars.  · Use healthy cooking methods such as roasting, grilling, broiling, baking, poaching, steaming, or stir-frying.  · Talk with a dietitian to learn about healthy cooking methods and how to eat less sodium.  Medicines  · Take over-the-counter and prescription medicines only as told by your health care provider.  · Do not take these medicines unless your health care provider approves:  ? Vitamin supplements that contain vitamin A or vitamin E.  ? Nonsteroidal anti-inflammatory drugs (NSAIDs), such as ibuprofen, naproxen, or celecoxib.  ? Hormone replacement therapy that contains estrogen.  If you are taking blood thinners:  · Talk with your health care provider before you take any medicines that contain aspirin or NSAIDs. These medicines increase your risk for dangerous bleeding.  · Take your medicine exactly as told, at the same time every day.  · Avoid activities that could cause injury or bruising, and follow instructions about how to prevent falls.  · Wear a medical alert bracelet, and carry a card that lists what medicines you take.  Activity  · Join a cardiac rehabilitation program. An exercise plan  will be developed for you.  · Ask your health care provider:  ? What activities and exercises are safe for you.  ? If you should follow specific instructions about lifting, driving, or climbing stairs.  Lifestyle  · Do not use any products that contain nicotine or tobacco, such as cigarettes and e-cigarettes. If you need help quitting, ask your health care provider.  · If your health care provider says that alcohol is safe for you, limit your alcohol intake to no more than 1 drink a day. One drink equals 12 oz of beer, 5 oz of wine, or 1½ oz of hard liquor.  · Maintain a healthy weight. If you need to lose weight, work with your health care provider to do so safely.  General instructions  · Tell all the health care providers who care for you about your heart condition, including your dentist. This may affect the medicines or treatment you receive.  · Manage any other health conditions you have, such as hypertension or diabetes. These conditions affect your heart.  · Learn ways to manage stress.  · Get screened for depression, and get mental health treatment if you need it. People with ACS are at higher risk for depression.  · Keep your vaccinations up to date. Get the flu shot (influenza vaccine) every year.  · If directed, monitor your blood pressure at home.  · Keep all follow-up visits as told by your health care provider. This is important.  Contact a health care provider if:  · You feel overwhelmed or sad.  · You have trouble doing your daily activities.  Get help right away if:  · You have pain in your chest, neck, arm, jaw, stomach, or back that recurs, and:  ? It lasts for more than a few minutes.  ? It is not relieved by taking the medicineyour health care provider prescribed.  · You have unexplained:  ? Heavy sweating.  ? Heartburn or indigestion.  ? Nausea or vomiting.  ? Shortness of breath.  ? Difficulty breathing.  ? Fatigue.  ? Nervousness or anxiety.  ? Weakness.  ? Diarrhea.  ? Dark stools or blood  in your stool.  · You have sudden light-headedness or dizziness.  · Your blood pressure is higher than 180/120.  · You faint.  · You have thoughts about hurting yourself.  These symptoms may represent a serious problem that is an emergency. Do not wait to see if the symptoms will go away. Get medical help right away. Call your local emergency services (911 in the [place] Do not drive yourself to the hospital.   If you ever feel like you may hurt yourself or others, or have thoughts about taking your own life, get help right away. You can go to your nearest emergency department or call:  · Emergency services (911 in the [place]  · A suicide crisis helpline, such as the National Suicide Prevention Lifeline at 1-985.572.4499. This is open 24 hours a day.  Summary  · Acute coronary syndrome (ACS) is when there is not enough blood and oxygen being supplied to the heart. ACS can result in chest pain or a heart attack.  · Acute coronary syndrome is a medical emergency. If you have any symptoms of this condition, get help right away.  · Treatment includes medicines and procedures to open the blocked arteries and restore blood flow.  This information is not intended to replace advice given to you by your health care provider. Make sure you discuss any questions you have with your health care provider.  Document Released: 12/18/2006 Document Revised: 08/28/2018 Document Reviewed: 08/28/2018  [employer] Interactive Patient Education © 2019 [employer]

## 2019-11-01 NOTE — PROGRESS NOTES
Subjective     Kaye Cano is a 79 y.o. female who presents to day for Shortness of Breath (Heart cath and CTA of chest 9-20-19 also seen Dr Vincent. ).    CHIEF COMPLIANT  Chief Complaint   Patient presents with   • Shortness of Breath     Heart cath and CTA of chest 9-20-19 also seen Dr Vincent.      Problem List:     1. Chest Pain  1.1 stress test 6/17/19-anterior wall ischemia, post-rest EF 59%, marked uptake of radiopharmaceutical in the subdiaphragmatic region both post stress and at rest  2.  Palpitations  2.1 event monitor 5/28-6/26/19-9 beat run of nonsustained V. tach, PACs, PVCs  3.  Hypertension  4.  Hyperlipidemia  5.  Diverticulitis  6.  AVMs of the bowel  6.1 EGD/colonoscopy 7/23/19-2 duodenal AVMs ablated, 2 gastric AVMs ablated, 2: AVMs ablated, mild left-sided diverticulosis  7.  Shortness of breath  7.1 echo 7/24/19-extra systolic beats, EF 60 to 65%, mild MR, mild TR   7.2 9/2019 LHC 30 to 40% disease distally in the LAD no other disease noted   Active Problems:  Problem List Items Addressed This Visit        Cardiovascular and Mediastinum    Ventricular tachycardia (paroxysmal) (CMS/HCC)    Relevant Orders    Magnesium       Respiratory    Shortness of breath - Primary    Relevant Orders    CBC & Differential    Basic Metabolic Panel    Magnesium      Other Visit Diagnoses     Fatigue, unspecified type        Relevant Orders    Magnesium          HPI  HPI  Patient is an 79-year-old female who is being followed up in the office today for follow-up from heart cath associated with positive stress test and shortness of air.  Patient verbalized that she still gets short of air and it is exacerbated when she eats.  States that the shortness of air usually improves after approximately 30 minutes when she stops eating.  Stated that she is been being followed by Dr. Vincent and was told that her lungs are in good shape.  However patient does state that when she uses her inhaler that helps the shortness of  air.  Patient does state that she has more fatigue associated with her shortness of air and that has decreased her exercise tolerance because she gets tired faster due to being short of breath.  Denies any chest pain, PND, orthopnea, palpitations, edema, syncope, or any neurological changes.  PRIOR MEDS  Current Outpatient Medications on File Prior to Visit   Medication Sig Dispense Refill   • aspirin 81 MG EC tablet Take 81 mg by mouth Daily.     • atorvastatin (LIPITOR) 20 MG tablet Take 20 mg by mouth Every Night.     • IRON PO Take  by mouth.     • Misc Natural Products (OSTEO BI-FLEX JOINT SHIELD PO) Take  by mouth 2 (Two) Times a Day.     • nitroglycerin (NITROSTAT) 0.4 MG SL tablet 1 under the tongue as needed for angina, may repeat q5mins for up three doses within 15 minutes 25 tablet 2   • triamterene-hydrochlorothiazide (MAXZIDE-25) 37.5-25 MG per tablet Take 1 tablet by mouth Daily.     • [DISCONTINUED] isosorbide dinitrate (ISORDIL) 10 MG tablet Take 10 mg by mouth 2 (Two) Times a Day.       No current facility-administered medications on file prior to visit.        ALLERGIES  Codeine and Plavix [clopidogrel]    HISTORY  Past Medical History:   Diagnosis Date   • Diverticulitis    • Hyperlipidemia    • Hypertension        Social History     Socioeconomic History   • Marital status:      Spouse name: Not on file   • Number of children: Not on file   • Years of education: Not on file   • Highest education level: Not on file   Tobacco Use   • Smoking status: Former Smoker     Packs/day: 1.00     Years: 40.00     Pack years: 40.00     Types: Electronic Cigarette     Last attempt to quit: 2018     Years since quittin.3   • Smokeless tobacco: Never Used   • Tobacco comment: Still uses E-cigs    Substance and Sexual Activity   • Alcohol use: No     Frequency: Never   • Drug use: No   • Sexual activity: Defer       History reviewed. No pertinent family history.    Review of Systems  "  Constitutional: Positive for fatigue (tired easy with SOA). Negative for chills and fever.   HENT: Negative.    Eyes: Negative.  Negative for visual disturbance.   Respiratory: Positive for shortness of breath (worse after eating). Negative for chest tightness.         Had Chest CTA 9-20-19    Seen Dr Vincent also in September 2019   Cardiovascular: Negative.  Negative for chest pain, palpitations and leg swelling.   Gastrointestinal: Positive for blood in stool (Sees Dr Waggoner next week (Thursday)). Negative for abdominal pain, constipation, diarrhea, nausea and vomiting.   Endocrine: Negative.    Genitourinary: Negative.    Musculoskeletal: Negative.    Skin: Negative.    Allergic/Immunologic: Negative.    Neurological: Negative.    Hematological: Bruises/bleeds easily (bruises).   Psychiatric/Behavioral: Negative.  Negative for sleep disturbance (denies waking with soa).       Objective     VITALS: /69 (BP Location: Left arm, Patient Position: Sitting)   Pulse 67   Ht 160 cm (63\")   Wt 63 kg (139 lb)   SpO2 99%   BMI 24.62 kg/m²     LABS:   Lab Results (most recent)     None          IMAGING:   No Images in the past 120 days found..    EXAM:  Physical Exam   Constitutional: She is oriented to person, place, and time. Vital signs are normal. She appears well-developed and well-nourished.   HENT:   Head: Normocephalic and atraumatic.   Nose: Nose normal.   Eyes: Conjunctivae and EOM are normal. Pupils are equal, round, and reactive to light.   Neck: Trachea normal. Neck supple. No JVD present. Carotid bruit is not present. No thyromegaly present.   Cardiovascular: Normal rate, regular rhythm, S1 normal, S2 normal and intact distal pulses. Exam reveals no gallop and no friction rub.   No murmur heard.  Pulses:       Radial pulses are 2+ on the right side, and 2+ on the left side.        Posterior tibial pulses are 2+ on the right side, and 2+ on the left side.   Pulmonary/Chest: Effort normal and breath " sounds normal. No respiratory distress. She has no wheezes. She has no rales.   Abdominal: Soft. Normal appearance and bowel sounds are normal. She exhibits no distension. There is no tenderness.   Musculoskeletal: Normal range of motion. She exhibits no edema or deformity.   Neurological: She is alert and oriented to person, place, and time. She has normal strength. No cranial nerve deficit or sensory deficit.   Skin: Skin is warm, dry and intact. Capillary refill takes less than 2 seconds.   Psychiatric: She has a normal mood and affect. Her speech is normal and behavior is normal. Judgment and thought content normal. Cognition and memory are normal.       Procedure   Procedures       Assessment/Plan    Diagnosis Plan   1. Shortness of breath  CBC & Differential    Basic Metabolic Panel    Magnesium   2. Fatigue, unspecified type  Magnesium   3. Ventricular tachycardia (paroxysmal) (CMS/[facility])  Magnesium   1.  Patient shortness of breath has been persistent regardless of negative heart cath and echocardiogram.  The only abnormality that is able to be found is a potential cause is her paroxysmal ventricular tachycardia that was identified by Holter monitor.  At that time patient did have a mildly low potassium.  We will recheck her electrolytes and CBC.  Discussed beta-blocker therapy with patient however informed that heart rates already 67 may decrease heart rate and lead to dizziness and other possible complications.  Patient decided at that point she wishes to defer beta-blocker therapy at this time.  2.  Patient informed of the signs and symptoms of ACS and literature given on ventricular tachycardia.  Patient advised to seek emergent treatment for any new or worsening symptoms.  Also advised sooner follow-up as needed.    Return in about 6 months (around 5/1/2020), or if symptoms worsen or fail to improve.    Kaye was seen today for shortness of breath.    Diagnoses and all orders for this visit:    Shortness  of breath  -     CBC & Differential; Future  -     Basic Metabolic Panel; Future  -     Magnesium; Future    Fatigue, unspecified type  -     Magnesium; Future    Ventricular tachycardia (paroxysmal) (CMS/HCC)  -     Magnesium; Future              Patient's Body mass index is 24.62 kg/m². BMI is within normal parameters. No follow-up required..           MEDS ORDERED DURING VISIT:  No orders of the defined types were placed in this encounter.          This document has been electronically signed by SAUL Villalpando Jr.  November 1, 2019 1:47 PM

## 2019-11-05 ENCOUNTER — TELEPHONE (OUTPATIENT)
Dept: CARDIOLOGY | Facility: CLINIC | Age: 79
End: 2019-11-05

## 2019-11-05 DIAGNOSIS — E87.6 HYPOKALEMIA: Primary | ICD-10-CM

## 2019-11-05 RX ORDER — POTASSIUM CHLORIDE 750 MG/1
10 TABLET, FILM COATED, EXTENDED RELEASE ORAL DAILY
Qty: 30 TABLET | Refills: 3 | Status: SHIPPED | OUTPATIENT
Start: 2019-11-05 | End: 2020-11-02

## 2019-11-05 NOTE — TELEPHONE ENCOUNTER
Per KELLY HUGHES patient to take KCL daily. First day only take 40 meq. Patient to have BMP in one month. Patient verbalized understanding, stated she will do labs downstairs. Korina Dyer LPN          ----- Message from SAUL Villalpando sent at 11/5/2019 10:21 AM EST -----  Please call the patient regarding her abnormal result.  Patient's labs were unremarkable except for a potassium of 3.3  prescribe K-Dur 10 mEq on previous visit.  Would advised to take a total of 40 mEq one-time dose.

## 2019-11-06 ENCOUNTER — RESULTS ENCOUNTER (OUTPATIENT)
Dept: CARDIOLOGY | Facility: CLINIC | Age: 79
End: 2019-11-06

## 2019-11-06 DIAGNOSIS — I47.29 VENTRICULAR TACHYCARDIA (PAROXYSMAL) (HCC): ICD-10-CM

## 2019-11-06 DIAGNOSIS — R06.02 SHORTNESS OF BREATH: ICD-10-CM

## 2019-11-06 DIAGNOSIS — R53.83 FATIGUE, UNSPECIFIED TYPE: ICD-10-CM

## 2019-12-01 ENCOUNTER — RESULTS ENCOUNTER (OUTPATIENT)
Dept: CARDIOLOGY | Facility: CLINIC | Age: 79
End: 2019-12-01

## 2019-12-01 DIAGNOSIS — R06.02 SHORTNESS OF BREATH: ICD-10-CM

## 2019-12-05 ENCOUNTER — LAB (OUTPATIENT)
Dept: LAB | Facility: HOSPITAL | Age: 79
End: 2019-12-05

## 2019-12-05 ENCOUNTER — TRANSCRIBE ORDERS (OUTPATIENT)
Dept: LAB | Facility: HOSPITAL | Age: 79
End: 2019-12-05

## 2019-12-05 DIAGNOSIS — D50.0 IRON DEFICIENCY ANEMIA SECONDARY TO BLOOD LOSS (CHRONIC): ICD-10-CM

## 2019-12-05 DIAGNOSIS — Z87.19 HISTORY OF GASTROINTESTINAL DISEASE: ICD-10-CM

## 2019-12-05 DIAGNOSIS — K55.20 ANGIODYSPLASIA OF INTESTINAL TRACT: ICD-10-CM

## 2019-12-05 DIAGNOSIS — K55.20 ANGIODYSPLASIA OF INTESTINAL TRACT: Primary | ICD-10-CM

## 2019-12-05 DIAGNOSIS — E87.6 HYPOKALEMIA: ICD-10-CM

## 2019-12-05 DIAGNOSIS — R53.83 FATIGUE, UNSPECIFIED TYPE: Primary | ICD-10-CM

## 2019-12-05 LAB
ANION GAP SERPL CALCULATED.3IONS-SCNC: 14.5 MMOL/L (ref 5–15)
BUN BLD-MCNC: 23 MG/DL (ref 8–23)
BUN/CREAT SERPL: 25.8 (ref 7–25)
CALCIUM SPEC-SCNC: 10.1 MG/DL (ref 8.6–10.5)
CHLORIDE SERPL-SCNC: 103 MMOL/L (ref 98–107)
CO2 SERPL-SCNC: 23.5 MMOL/L (ref 22–29)
CREAT BLD-MCNC: 0.89 MG/DL (ref 0.57–1)
DEPRECATED RDW RBC AUTO: 50.4 FL (ref 37–54)
ERYTHROCYTE [DISTWIDTH] IN BLOOD BY AUTOMATED COUNT: 15.8 % (ref 12.3–15.4)
GFR SERPL CREATININE-BSD FRML MDRD: 61 ML/MIN/1.73
GLUCOSE BLD-MCNC: 95 MG/DL (ref 65–99)
HCT VFR BLD AUTO: 37.8 % (ref 34–46.6)
HGB BLD-MCNC: 11.7 G/DL (ref 12–15.9)
MAGNESIUM SERPL-MCNC: 1.7 MG/DL (ref 1.6–2.4)
MCH RBC QN AUTO: 26.8 PG (ref 26.6–33)
MCHC RBC AUTO-ENTMCNC: 31 G/DL (ref 31.5–35.7)
MCV RBC AUTO: 86.7 FL (ref 79–97)
PLATELET # BLD AUTO: 283 10*3/MM3 (ref 140–450)
PMV BLD AUTO: 9.5 FL (ref 6–12)
POTASSIUM BLD-SCNC: 4.1 MMOL/L (ref 3.5–5.2)
RBC # BLD AUTO: 4.36 10*6/MM3 (ref 3.77–5.28)
SODIUM BLD-SCNC: 141 MMOL/L (ref 136–145)
WBC NRBC COR # BLD: 5.46 10*3/MM3 (ref 3.4–10.8)

## 2019-12-05 PROCEDURE — 85027 COMPLETE CBC AUTOMATED: CPT | Performed by: NURSE PRACTITIONER

## 2019-12-05 PROCEDURE — 80048 BASIC METABOLIC PNL TOTAL CA: CPT | Performed by: PHYSICIAN ASSISTANT

## 2019-12-05 PROCEDURE — 83735 ASSAY OF MAGNESIUM: CPT | Performed by: PHYSICIAN ASSISTANT

## 2019-12-05 PROCEDURE — 36415 COLL VENOUS BLD VENIPUNCTURE: CPT | Performed by: INTERNAL MEDICINE

## 2019-12-10 ENCOUNTER — TELEPHONE (OUTPATIENT)
Dept: CARDIOLOGY | Facility: CLINIC | Age: 79
End: 2019-12-10

## 2019-12-10 NOTE — TELEPHONE ENCOUNTER
Patient notified of results.  BRUNO HAGER    ----- Message from Korina Dyer LPN sent at 12/10/2019  1:56 PM EST -----      ----- Message -----  From: Robin Ramirez APRN  Sent: 12/9/2019   5:36 PM EST  To: Korina Dyer LPN    BMP looks good normal potassium normal renal function  ----- Message -----  From: Lab, Background User  Sent: 12/5/2019   6:32 PM EST  To: SAUL Villalpando

## 2020-05-01 ENCOUNTER — OFFICE VISIT (OUTPATIENT)
Dept: CARDIOLOGY | Facility: CLINIC | Age: 80
End: 2020-05-01

## 2020-05-01 DIAGNOSIS — R07.2 PRECORDIAL PAIN: ICD-10-CM

## 2020-05-01 DIAGNOSIS — R06.02 SHORTNESS OF BREATH: Primary | ICD-10-CM

## 2020-05-01 DIAGNOSIS — I10 ESSENTIAL HYPERTENSION: ICD-10-CM

## 2020-05-01 PROCEDURE — 99441 PR PHYS/QHP TELEPHONE EVALUATION 5-10 MIN: CPT | Performed by: NURSE PRACTITIONER

## 2020-05-01 NOTE — PATIENT INSTRUCTIONS
Acute Coronary Syndrome    Acute coronary syndrome (ACS) is a serious problem in which there is suddenly not enough blood and oxygen reaching the heart. ACS can result in chest pain or a heart attack.  This condition is a medical emergency. If you have any symptoms of this condition, get help right away.  What are the causes?  This condition may be caused by:  · Buildup of fat and cholesterol inside of the arteries (atherosclerosis). This is the most common cause. The buildup (plaque) can cause blood vessels in the heart (coronary arteries) to become narrow or blocked, which reduces blood flow to the heart. Plaque can also break off and lead to a clot, which can block an artery and cause a heart attack or stroke.  · Sudden tightening of the muscles around the coronary arteries (coronary spasm).  · Tearing of a coronary artery (spontaneous coronary artery dissection).  · Very low blood pressure (hypotension).  · An abnormal heartbeat (arrhythmia).  · Other medical conditions that cause a decrease of oxygen to the heart, such as anemiaorrespiratory failure.  · Using cocaine or methamphetamine.  What increases the risk?  The following factors may make you more likely to develop this condition:  · Age. The risk for ACS increases as you get older.  · History of chest pain, heart attack, peripheral artery disease, or stroke.  · Having taken chemotherapy or immune-suppressing medicines.  · Being male.  · Family history of chest pain, heart disease, or stroke.  · Smoking.  · Not exercising enough.  · Being overweight.  · High cholesterol.  · High blood pressure (hypertension).  · Diabetes.  · Excessive alcohol use.  What are the signs or symptoms?  Common symptoms of this condition include:  · Chest pain. The pain may last a long time, or it may stop and come back (recur). It may feel like:  ? Crushing or squeezing.  ? Tightness, pressure, fullness, or heaviness.  · Arm, neck, jaw, or back pain.  · Heartburn or  indigestion.  · Shortness of breath.  · Nausea.  · Sudden cold sweats.  · Light-headedness.  · Dizziness, or passing out.  · Tiredness (fatigue).  Sometimes there are no symptoms.  How is this diagnosed?  This condition may be diagnosed based on:  · Your medical history and symptoms.  · An electrocardiogram (ECG). This imaging test measures the heart's electrical activity.  · Blood tests. Cardiac blood tests may need to be repeated at designated time intervals.  · Chest X-ray.  · A CT scan of the chest.  · A coronary angiogram. This is a procedure in which dye is injected into the bloodstream and then X-rays are taken to show if there is a blockage in a coronary artery.  · Exercise stress testing.  · Echocardiography. This is a test that uses sound waves to produce detailed images of the heart.  How is this treated?  The treatment is to restore blood flow to the heart as soon as possible. Treatment for this condition may include:  · Oxygen therapy.  · Medicines, such as:  ? Antiplatelet medicines and blood-thinning medicines, such as aspirin. These help prevent blood clots.  ? Medicine that dissolves any blood clots (fibrinolytic therapy).  ? Blood pressure medicines.  ? Nitroglycerin. This helps relieve chest pain and widens blood vessels to improve blood flow.  ? Pain medicine.  ? Cholesterol-lowering medicine.  · Surgery, such as:  ? Coronary angioplasty with stent placement. This involves placing a small piece of metal that looks like mesh or a spring into a narrow coronary artery. This widens the artery and keep it open.  ? Coronary artery bypass surgery. This involves taking a section of a blood vessel from a different part of your body, and placing it on the blocked coronary artery to allow blood to flow around (bypass) the blockage.  · Cardiac rehabilitation. This is a program that helps improve your health and well-being. It includes exercise training, education, and counseling to help you recover.  Follow  these instructions at home:  Eating and drinking  · Eat a heart-healthy diet that includes whole grains, fruits and vegetables, lean proteins, and low-fat or nonfat dairy products.  · Limit how much salt (sodium) you eat as told by your health care provider. Follow instructions from your health care provider about any other eating or drinking restrictions, such as limiting foods that are high in fat and processed sugars.  · Use healthy cooking methods such as roasting, grilling, broiling, baking, poaching, steaming, or stir-frying.  · Talk with a dietitian to learn about healthy cooking methods and how to eat less sodium.  Medicines  · Take over-the-counter and prescription medicines only as told by your health care provider.  · Do not take these medicines unless your health care provider approves:  ? Vitamin supplements that contain vitamin A or vitamin E.  ? Nonsteroidal anti-inflammatory drugs (NSAIDs), such as ibuprofen, naproxen, or celecoxib.  ? Hormone replacement therapy that contains estrogen.  If you are taking blood thinners:  · Talk with your health care provider before you take any medicines that contain aspirin or NSAIDs. These medicines increase your risk for dangerous bleeding.  · Take your medicine exactly as told, at the same time every day.  · Avoid activities that could cause injury or bruising, and follow instructions about how to prevent falls.  · Wear a medical alert bracelet, and carry a card that lists what medicines you take.  Activity  · Join a cardiac rehabilitation program. An exercise plan will be developed for you.  · Ask your health care provider:  ? What activities and exercises are safe for you.  ? If you should follow specific instructions about lifting, driving, or climbing stairs.  Lifestyle  · Do not use any products that contain nicotine or tobacco, such as cigarettes and e-cigarettes. If you need help quitting, ask your health care provider.  · If your health care provider  says that alcohol is safe for you, limit your alcohol intake to no more than 1 drink a day. One drink equals 12 oz of beer, 5 oz of wine, or 1½ oz of hard liquor.  · Maintain a healthy weight. If you need to lose weight, work with your health care provider to do so safely.  General instructions  · Tell all the health care providers who care for you about your heart condition, including your dentist. This may affect the medicines or treatment you receive.  · Manage any other health conditions you have, such as hypertension or diabetes. These conditions affect your heart.  · Learn ways to manage stress.  · Get screened for depression, and get mental health treatment if you need it. People with ACS are at higher risk for depression.  · Keep your vaccinations up to date. Get the flu shot (influenza vaccine) every year.  · If directed, monitor your blood pressure at home.  · Keep all follow-up visits as told by your health care provider. This is important.  Contact a health care provider if:  · You feel overwhelmed or sad.  · You have trouble doing your daily activities.  Get help right away if:  · You have pain in your chest, neck, arm, jaw, stomach, or back that recurs, and:  ? It lasts for more than a few minutes.  ? It is not relieved by taking the medicineyour health care provider prescribed.  · You have unexplained:  ? Heavy sweating.  ? Heartburn or indigestion.  ? Nausea or vomiting.  ? Shortness of breath.  ? Difficulty breathing.  ? Fatigue.  ? Nervousness or anxiety.  ? Weakness.  ? Diarrhea.  ? Dark stools or blood in your stool.  · You have sudden light-headedness or dizziness.  · Your blood pressure is higher than 180/120.  · You faint.  · You have thoughts about hurting yourself.  These symptoms may represent a serious problem that is an emergency. Do not wait to see if the symptoms will go away. Get medical help right away. Call your local emergency services (911 in the [place] Do not drive yourself to the  hospital.   If you ever feel like you may hurt yourself or others, or have thoughts about taking your own life, get help right away. You can go to your nearest emergency department or call:  · Emergency services (911 in the [place]  · A suicide crisis helpline, such as the National Suicide Prevention Lifeline at 1-564.980.1143. This is open 24 hours a day.  Summary  · Acute coronary syndrome (ACS) is when there is not enough blood and oxygen being supplied to the heart. ACS can result in chest pain or a heart attack.  · Acute coronary syndrome is a medical emergency. If you have any symptoms of this condition, get help right away.  · Treatment includes medicines and procedures to open the blocked arteries and restore blood flow.  This information is not intended to replace advice given to you by your health care provider. Make sure you discuss any questions you have with your health care provider.  Document Released: 12/18/2006 Document Revised: 08/28/2018 Document Reviewed: 08/28/2018  [employer] Patient Education © 2019 [employer]

## 2020-05-01 NOTE — PROGRESS NOTES
Subjective        You have chosen to receive care through a telephone visit. Do you consent to use a telephone visit for your medical care today? Yes      Kaye Cano is a 79 y.o. female who presents to day for Shortness of Breath.    CHIEF COMPLIANT  Chief Complaint   Patient presents with   • Shortness of Breath     Active Problems:  Problem List Items Addressed This Visit        Cardiovascular and Mediastinum    Essential hypertension       Respiratory    Shortness of breath - Primary       Nervous and Auditory    Precordial pain      Problem List:     1. Chest Pain  1.1 stress test 6/17/19-anterior wall ischemia, post-rest EF 59%, marked uptake of radiopharmaceutical in the subdiaphragmatic region both post stress and at rest  2.  Palpitations  2.1 event monitor 5/28-6/26/19-9 beat run of nonsustained V. tach, PACs, PVCs  3.  Hypertension  4.  Hyperlipidemia  5.  Diverticulitis  6.  AVMs of the bowel  6.1 EGD/colonoscopy 7/23/19-2 duodenal AVMs ablated, 2 gastric AVMs ablated, 2: AVMs ablated, mild left-sided diverticulosis  7.  Shortness of breath  7.1 echo 7/24/19-extra systolic beats, EF 60 to 65%, mild MR, mild TR   7.2 9/2019 LHC 30 to 40% disease distally in the LAD no other disease noted   HPI  Ms. Cano is an 79-year-old female patient who is been followed up today for shortness of breath.  Patient does report that she still has chronic shortness of breath without no significant changes.  She states that is intermittent in nature and is good 1 day and bad the next day.  She says her oxygen saturations run between 9798%.  She did report one episode of chest tightness approximately 1 week ago that only lasted a few seconds and was relieved with Tums.  No associated, aggravating, or relieving factors were identified.  Patient did have a left heart catheterization in September of 2019 which identified 40% in the LAD and no other significant disease noted.  Patient says overall she feels pretty good.   She denies any lower extremity edema, palpitations, syncope, PND, or orthopnea.  PRIOR MEDS  Current Outpatient Medications on File Prior to Visit   Medication Sig Dispense Refill   • aspirin 81 MG EC tablet Take 81 mg by mouth Daily.     • atorvastatin (LIPITOR) 20 MG tablet Take 20 mg by mouth Every Night.     • Misc Natural Products (OSTEO BI-FLEX JOINT SHIELD PO) Take  by mouth 2 (Two) Times a Day.     • nitroglycerin (NITROSTAT) 0.4 MG SL tablet 1 under the tongue as needed for angina, may repeat q5mins for up three doses within 15 minutes 25 tablet 2   • potassium chloride (K-DUR) 10 MEQ CR tablet Take 1 tablet by mouth Daily. 30 tablet 3   • triamterene-hydrochlorothiazide (MAXZIDE-25) 37.5-25 MG per tablet Take 1 tablet by mouth Daily.       No current facility-administered medications on file prior to visit.        ALLERGIES  Plavix [clopidogrel] and Codeine    HISTORY  Past Medical History:   Diagnosis Date   • Diverticulitis    • Hyperlipidemia    • Hypertension        Social History     Socioeconomic History   • Marital status:      Spouse name: Not on file   • Number of children: Not on file   • Years of education: Not on file   • Highest education level: Not on file   Tobacco Use   • Smoking status: Former Smoker     Packs/day: 1.00     Years: 40.00     Pack years: 40.00     Types: Electronic Cigarette     Last attempt to quit: 2018     Years since quittin.8   • Smokeless tobacco: Never Used   • Tobacco comment: Still uses E-cigs    Substance and Sexual Activity   • Alcohol use: No     Frequency: Never   • Drug use: No   • Sexual activity: Defer       History reviewed. No pertinent family history.    Review of Systems   Constitutional: Positive for fatigue. Negative for chills and fever.   HENT: Negative for congestion, rhinorrhea, sinus pressure and sore throat.    Eyes: Positive for visual disturbance (glassese).   Respiratory: Positive for chest tightness (first of the week, went away  after a few seconds, releived with tums) and shortness of breath.    Cardiovascular: Negative.  Negative for chest pain, palpitations and leg swelling.   Gastrointestinal: Negative.  Negative for abdominal pain, blood in stool, constipation, diarrhea, nausea and vomiting.   Endocrine: Negative.  Negative for cold intolerance and heat intolerance.   Genitourinary: Negative for dysuria, frequency, hematuria and urgency.   Musculoskeletal: Negative.  Negative for arthralgias, back pain and neck pain.   Skin: Negative.  Negative for rash and wound.   Allergic/Immunologic: Negative.  Negative for environmental allergies and food allergies.   Neurological: Negative for dizziness, syncope and light-headedness.   Hematological: Bruises/bleeds easily.   Psychiatric/Behavioral: Positive for sleep disturbance (woke up a few nighst ago cp and sweating, denies cp).     Objective     VITALS: There were no vitals taken for this visit.    LABS:   Lab Results (most recent)     None        IMAGING:   No Images in the past 120 days found..    EXAM:  Physical Exam  Patient was evaluated via telephone due to the coronavirus pandemic.  Per auscultation over the telephone patient's breathing rate was within normal limits, speech was clear, no audible wheezing was noted.  Was able to speak in full sentences without difficulty.  Their mood was appropriate for the situation was able to answer questions appropriately.  There is no signs of apparent distress.  Procedure   Procedures       Assessment/Plan    Diagnosis Plan   1. Shortness of breath     2. Precordial pain     3. Essential hypertension     1.  Patient does have chronic shortness of breath that is unchanged from previous visit.  Patient did recently have a heart cath with minimal coronary artery disease up to 40% in the LAD.  Otherwise no significant disease.  No further medication or diagnostic studies are warranted at this time.  2.  Patient did have one episode of chest  tightness that lasted a few seconds and relieved with Tums.  This does appear to be GI in nature.  Patient advised take nitroglycerin for chest pain if relieves the pain to notify me and we would start medications such as long-acting nitrates as Imdur.  3.  Patient's reports that blood pressure is well controlled on current blood pressure medication regimen.  No medication changes are warranted at this time.  Patient advised to monitor blood pressure on a daily basis and report any persistent highs or lows.  Set goal blood pressure for patient at 130/80 or below.  4.  Informed of signs and symptoms of ACS and advised to seek emergent treatment for any new worsening symptoms.  Patient also advised sooner follow-up as needed.  Also advised to follow-up with family doctor as needed    Return in about 6 months (around 11/1/2020).    Kaye was seen today for shortness of breath.    Diagnoses and all orders for this visit:    Shortness of breath    Precordial pain    Essential hypertension        Kaye Cano  reports that she quit smoking about 22 months ago. Her smoking use included electronic cigarette. She has a 40.00 pack-year smoking history. She has never used smokeless tobacco.. I have educated her on the risk of diseases from using tobacco products such as cancer, COPD and heart diease.     I advised her to quit and she is not willing to quit.    I spent 3  minutes counseling the patient.     Advance Care Planning   ACP discussion was held with the patient during this visit. Patient has an advance directive (not in EMR), copy requested.      This visit has been rescheduled as a phone visit to comply with patient safety concerns in accordance with CDC recommendations. Total time of discussion was 6 minutes.             MEDS ORDERED DURING VISIT:  No orders of the defined types were placed in this encounter.          This document has been electronically signed by Robni Ramirez Jr., APRN  May 4, 2020 08:52

## 2020-11-02 ENCOUNTER — OFFICE VISIT (OUTPATIENT)
Dept: CARDIOLOGY | Facility: CLINIC | Age: 80
End: 2020-11-02

## 2020-11-02 VITALS
DIASTOLIC BLOOD PRESSURE: 66 MMHG | WEIGHT: 138 LBS | SYSTOLIC BLOOD PRESSURE: 133 MMHG | BODY MASS INDEX: 24.45 KG/M2 | HEIGHT: 63 IN | OXYGEN SATURATION: 99 % | HEART RATE: 58 BPM

## 2020-11-02 DIAGNOSIS — I10 ESSENTIAL HYPERTENSION: ICD-10-CM

## 2020-11-02 DIAGNOSIS — R06.02 SHORTNESS OF BREATH: Primary | ICD-10-CM

## 2020-11-02 DIAGNOSIS — T14.8XXA BRUISING: ICD-10-CM

## 2020-11-02 PROCEDURE — 93000 ELECTROCARDIOGRAM COMPLETE: CPT | Performed by: NURSE PRACTITIONER

## 2020-11-02 PROCEDURE — 99213 OFFICE O/P EST LOW 20 MIN: CPT | Performed by: NURSE PRACTITIONER

## 2020-11-02 NOTE — PATIENT INSTRUCTIONS
"Fat and Cholesterol Restricted Eating Plan  Getting too much fat and cholesterol in your diet may cause health problems. Choosing the right foods helps keep your fat and cholesterol at normal levels. This can keep you from getting certain diseases.  Your doctor may recommend an eating plan that includes:  · Total fat: ______% or less of total calories a day.  · Saturated fat: ______% or less of total calories a day.  · Cholesterol: less than _________mg a day.  · Fiber: ______g a day.  What are tips for following this plan?  Meal planning  · At meals, divide your plate into four equal parts:  ? Fill one-half of your plate with vegetables and green salads.  ? Fill one-fourth of your plate with whole grains.  ? Fill one-fourth of your plate with low-fat (lean) protein foods.  · Eat fish that is high in omega-3 fats at least two times a week. This includes mackerel, tuna, sardines, and salmon.  · Eat foods that are high in fiber, such as whole grains, beans, apples, broccoli, carrots, peas, and barley.  General tips    · Work with your doctor to lose weight if you need to.  · Avoid:  ? Foods with added sugar.  ? Fried foods.  ? Foods with partially hydrogenated oils.  · Limit alcohol intake to no more than 1 drink a day for nonpregnant women and 2 drinks a day for men. One drink equals 12 oz of beer, 5 oz of wine, or 1½ oz of hard liquor.  Reading food labels  · Check food labels for:  ? Trans fats.  ? Partially hydrogenated oils.  ? Saturated fat (g) in each serving.  ? Cholesterol (mg) in each serving.  ? Fiber (g) in each serving.  · Choose foods with healthy fats, such as:  ? Monounsaturated fats.  ? Polyunsaturated fats.  ? Omega-3 fats.  · Choose grain products that have whole grains. Look for the word \"whole\" as the first word in the ingredient list.  Cooking  · Cook foods using low-fat methods. These include baking, boiling, grilling, and broiling.  · Eat more home-cooked foods. Eat at restaurants and buffets " less often.  · Avoid cooking using saturated fats, such as butter, cream, palm oil, palm kernel oil, and coconut oil.  Recommended foods    Fruits  · All fresh, canned (in natural juice), or frozen fruits.  Vegetables  · Fresh or frozen vegetables (raw, steamed, roasted, or grilled). Green salads.  Grains  · Whole grains, such as whole wheat or whole grain breads, crackers, cereals, and pasta. Unsweetened oatmeal, bulgur, barley, quinoa, or brown rice. Corn or whole wheat flour tortillas.  Meats and other protein foods  · Ground beef (85% or leaner), grass-fed beef, or beef trimmed of fat. Skinless chicken or turkey. Ground chicken or turkey. Pork trimmed of fat. All fish and seafood. Egg whites. Dried beans, peas, or lentils. Unsalted nuts or seeds. Unsalted canned beans. Nut butters without added sugar or oil.  Dairy  · Low-fat or nonfat dairy products, such as skim or 1% milk, 2% or reduced-fat cheeses, low-fat and fat-free ricotta or cottage cheese, or plain low-fat and nonfat yogurt.  Fats and oils  · Tub margarine without trans fats. Light or reduced-fat mayonnaise and salad dressings. Avocado. Olive, canola, sesame, or safflower oils.  The items listed above may not be a complete list of foods and beverages you can eat. Contact a dietitian for more information.  Foods to avoid  Fruits  · Canned fruit in heavy syrup. Fruit in cream or butter sauce. Fried fruit.  Vegetables  · Vegetables cooked in cheese, cream, or butter sauce. Fried vegetables.  Grains  · White bread. White pasta. White rice. Cornbread. Bagels, pastries, and croissants. Crackers and snack foods that contain trans fat and hydrogenated oils.  Meats and other protein foods  · Fatty cuts of meat. Ribs, chicken wings, arias, sausage, bologna, salami, chitterlings, fatback, hot dogs, bratwurst, and packaged lunch meats. Liver and organ meats. Whole eggs and egg yolks. Chicken and turkey with skin. Fried meat.  Dairy  · Whole or 2% milk, cream,  half-and-half, and cream cheese. Whole milk cheeses. Whole-fat or sweetened yogurt. Full-fat cheeses. Nondairy creamers and whipped toppings. Processed cheese, cheese spreads, and cheese curds.  Beverages  · Alcohol. Sugar-sweetened drinks such as sodas, lemonade, and fruit drinks.  Fats and oils  · Butter, stick margarine, lard, shortening, ghee, or arias fat. Coconut, palm kernel, and palm oils.  Sweets and desserts  · Corn syrup, sugars, honey, and molasses. Candy. Jam and jelly. Syrup. Sweetened cereals. Cookies, pies, cakes, donuts, muffins, and ice cream.  The items listed above may not be a complete list of foods and beverages you should avoid. Contact a dietitian for more information.  Summary  · Choosing the right foods helps keep your fat and cholesterol at normal levels. This can keep you from getting certain diseases.  · At meals, fill one-half of your plate with vegetables and green salads.  · Eat high-fiber foods, like whole grains, beans, apples, carrots, peas, and barley.  · Limit added sugar, saturated fats, alcohol, and fried foods.  This information is not intended to replace advice given to you by your health care provider. Make sure you discuss any questions you have with your health care provider.  Document Released: 06/18/2013 Document Revised: 08/21/2019 Document Reviewed: 09/04/2018  [employer] Patient Education © 2020 [employer]  How to Quarantine at Home  Information for Patients and Families    These instructions are for people with confirmed or suspected COVID-19 who do not need to be hospitalized and those with confirmed COVID-19 who were hospitalized and discharged to care for themselves at home.    If you were tested through the Health Department  The Health Department will monitor your wellbeing.  If it is determined that you do not need to be hospitalized and can be isolated at home, you will be monitored by staff from your local or state health department.     If you were tested  through a Commercial Lab  You will need to monitor yourself and report changes in your symptoms to your doctor.  See the section below called Monitor Your Symptoms.    Follow these steps until a healthcare provider or local or state health department says you can return to your normal activities.    Stay home except to get medical care  • Restrict activities outside your home, except for getting medical care.   • Do not go to work, school, or public areas.   • Avoid using public transportation, ride-sharing, or taxis.    Separate yourself from other people and animals in your home  People  As much as possible, you should stay in a specific room and away from other people in your home. Also, you should use a separate bathroom, if available.    Animals  You should restrict contact with pets and other animals while you are sick with COVID-19, just like you would around other people. When possible, have another member of your household care for your animals while you are sick. If you are sick with COVID-19, avoid contact with your pet, including petting, snuggling, being kissed or licked, and sharing food. If you must care for your pet or be around animals while you are sick, wash your hands before and after you interact with pets and wear a facemask. See COVID-19 and Animals for more information.    Call ahead before visiting your doctor  If you have a medical appointment, call the healthcare provider and tell them that you have or may have COVID-19. This information will help the healthcare provider’s office take steps to keep other people from getting infected or exposed.    Wear a facemask  You should wear a facemask when you are around other people (e.g., sharing a room or vehicle) or pets and before you enter a healthcare provider’s office.     If you are not able to wear a facemask (for example, because it causes trouble breathing), then people who live with you should not stay in the same room with you, or they  should wear a facemask if they enter your room.    Cover your coughs and sneezes  • Cover your mouth and nose with a tissue when you cough or sneeze.   • Throw used tissues in a lined trash can.   • Immediately wash your hands with soap and water for at least 20 seconds or, if soap and water are not available, clean your hands with an alcohol-based hand  that contains at least 60% alcohol.    Clean your hands often  • Wash your hands often with soap and water for at least 20 seconds, especially after blowing your nose, coughing, or sneezing; going to the bathroom; and before eating or preparing food.     • If soap and water are not readily available, use an alcohol-based hand  with at least 60% alcohol, covering all surfaces of your hands and rubbing them together until they feel dry.    • Soap and water are the best option if hands are visibly dirty. Avoid touching your eyes, nose, and mouth with unwashed hands.    Avoid sharing personal household items  • You should not share dishes, drinking glasses, cups, eating utensils, towels, or bedding with other people or pets in your home.   • After using these items, they should be washed thoroughly with soap and water.    Clean all “high-touch” surfaces everyday  • High touch surfaces include counters, tabletops, doorknobs, bathroom fixtures, toilets, phones, keyboards, tablets, and bedside tables.   • Also, clean any surfaces that may have blood, stool, or body fluids on them.   • Use a household cleaning spray or wipe, according to the label instructions. Labels contain instructions for safe and effective use of the cleaning product, including precautions you should take when applying the product, such as wearing gloves and making sure you have good ventilation during use of the product.    Monitor your symptoms  • Seek prompt medical attention if your illness is worsening (e.g., difficulty breathing).   • Before seeking care, call your healthcare  provider and tell them that you have, or are being evaluated for, COVID-19.   • Put on a facemask before you enter the facility.     • These steps will help the healthcare provider’s office to keep other people in the office or waiting room from getting infected or exposed.   • Persons who are placed under active monitoring or facilitated self-monitoring should follow instructions provided by their local health department or occupational health professionals, as appropriate.  • If you have a medical emergency and need to call 911, notify the dispatch personnel that you have, or are being evaluated for COVID-19. If possible, put on a facemask before emergency medical services arrive.    Discontinuing home isolation  Patients with confirmed COVID-19 should remain under home isolation precautions until the risk of secondary transmission to others is thought to be low. The decision to discontinue home isolation precautions should be made on a case-by-case basis, in consultation with healthcare providers and state and local health departments.    The below content are for household members, intimate partners, and caregivers of a patient with symptomatic laboratory-confirmed COVID-19 or a patient under investigation:    Household members, intimate partners, and caregivers may have close contact with a person with symptomatic, laboratory-confirmed COVID-19 or a person under investigation.     Close contacts should monitor their health; they should call their healthcare provider right away if they develop symptoms suggestive of COVID-19 (e.g., fever, cough, shortness of breath)     Close contacts should also follow these recommendations:  • Make sure that you understand and can help the patient follow their healthcare provider’s instructions for medication(s) and care. You should help the patient with basic needs in the home and provide support for getting groceries, prescriptions, and other personal needs.  • Monitor the  patient’s symptoms. If the patient is getting sicker, call his or her healthcare provider and tell them that the patient has laboratory-confirmed COVID-19. This will help the healthcare provider’s office take steps to keep other people in the office or waiting room from getting infected. Ask the healthcare provider to call the local or state health department for additional guidance. If the patient has a medical emergency and you need to call 911, notify the dispatch personnel that the patient has, or is being evaluated for COVID-19.  • Household members should stay in another room or be  from the patient as much as possible. Household members should use a separate bedroom and bathroom, if available.  • Prohibit visitors who do not have an essential need to be in the home.  • Household members should care for any pets in the home. Do not handle pets or other animals while sick.  For more information, see COVID-19 and Animals.  • Make sure that shared spaces in the home have good air flow, such as by an air conditioner or an opened window, weather permitting.  • Perform hand hygiene frequently. Wash your hands often with soap and water for at least 20 seconds or use an alcohol-based hand  that contains 60 to 95% alcohol, covering all surfaces of your hands and rubbing them together until they feel dry. Soap and water should be used preferentially if hands are visibly dirty.  • Avoid touching your eyes, nose, and mouth with unwashed hands.  • The patient should wear a facemask when you are around other people. If the patient is not able to wear a facemask (for example, because it causes trouble breathing), you, as the caregiver, should wear a mask when you are in the same room as the patient.  • Wear a disposable facemask and gloves when you touch or have contact with the patient’s blood, stool, or body fluids, such as saliva, sputum, nasal mucus, vomit, or urine.   o Throw out disposable facemasks  and gloves after using them. Do not reuse.  o When removing personal protective equipment, first remove and dispose of gloves. Then, immediately clean your hands with soap and water or alcohol-based hand . Next, remove and dispose of facemask, and immediately clean your hands again with soap and water or alcohol-based hand .  • Avoid sharing household items with the patient. You should not share dishes, drinking glasses, cups, eating utensils, towels, bedding, or other items. After the patient uses these items, you should wash them thoroughly (see below “Wash laundry thoroughly”).  • Clean all “high-touch” surfaces, such as counters, tabletops, doorknobs, bathroom fixtures, toilets, phones, keyboards, tablets, and bedside tables, every day. Also, clean any surfaces that may have blood, stool, or body fluids on them.   o Use a household cleaning spray or wipe, according to the label instructions. Labels contain instructions for safe and effective use of the cleaning product including precautions you should take when applying the product, such as wearing gloves and making sure you have good ventilation during use of the product.  • Wash laundry thoroughly.   o Immediately remove and wash clothes or bedding that have blood, stool, or body fluids on them.  o Wear disposable gloves while handling soiled items and keep soiled items away from your body. Clean your hands (with soap and water or an alcohol-based hand ) immediately after removing your gloves.  o Read and follow directions on labels of laundry or clothing items and detergent. In general, using a normal laundry detergent according to washing machine instructions and dry thoroughly using the warmest temperatures recommended on the clothing label.  • Place all used disposable gloves, facemasks, and other contaminated items in a lined container before disposing of them with other household waste. Clean your hands (with soap and water or  an alcohol-based hand ) immediately after handling these items. Soap and water should be used preferentially if hands are visibly dirty.  • Discuss any additional questions with your state or local health department or healthcare provider.    Adapted from information provided by the Centers for Disease Control and Prevention.  For more information, visit https://www.cdc.gov/coronavirus/2019-ncov/hcp/guidance-prevent-spread.html

## 2020-11-02 NOTE — PROGRESS NOTES
Subjective     Kaye Cano is a 80 y.o. female who presents to day for Shortness of Breath (6 month follow up. No longer taking aspirin, Danis Katalina stopped r/t bruising) and Hypertension.    CHIEF COMPLIANT  Chief Complaint   Patient presents with   • Shortness of Breath     6 month follow up. No longer taking aspirin, Danis Katalina stopped r/t bruising   • Hypertension       Active Problems:  Problem List Items Addressed This Visit        Cardiovascular and Mediastinum    Essential hypertension    Relevant Orders    ECG 12 Lead       Respiratory    Shortness of breath - Primary    Relevant Orders    ECG 12 Lead      Other Visit Diagnoses     Bruising          Problem List:     1. Chest Pain  1.1 stress test 6/17/19-anterior wall ischemia, post-rest EF 59%, marked uptake of radiopharmaceutical in the subdiaphragmatic region both post stress and at rest  2.  Palpitations  2.1 event monitor 5/28-6/26/19-9 beat run of nonsustained V. tach, PACs, PVCs  3.  Hypertension  4.  Hyperlipidemia  5.  Diverticulitis  6.  AVMs of the bowel  6.1 EGD/colonoscopy 7/23/19-2 duodenal AVMs ablated, 2 gastric AVMs ablated, 2: AVMs ablated, mild left-sided diverticulosis  7.  Shortness of breath  7.1 echo 7/24/19-extra systolic beats, EF 60 to 65%, mild MR, mild TR   7.2 9/2019 C 30 to 40% disease distally in the LAD no other disease noted     HPI  HPI  Ms. Cano is an 80-year-old female patient who is being followed up today for shortness of breath and chronic arterial hypertension.  Patient's blood pressure is well controlled on her current blood pressure meds Maxide.  She denies any associated symptoms of hypertension.  States that she is actually doing relatively well.  Patient also has chronic shortness of air that is intermittent in nature and exacerbated by exertion.  She says things such as climbing stairs causes her to become dyspneic.  She is able to perform activities of daily living without any issues.  She says that  her shortness of breath is nonprogressive and is about the same as it was previously.  She denies any other angina anginal equivalent symptoms.  She denies chest pain, palpitations, lower extremity edema, fatigue, syncope, orthopnea, or other neurological problems.  PRIOR MEDS  Current Outpatient Medications on File Prior to Visit   Medication Sig Dispense Refill   • atorvastatin (LIPITOR) 20 MG tablet Take 20 mg by mouth Every Night.     • nitroglycerin (NITROSTAT) 0.4 MG SL tablet 1 under the tongue as needed for angina, may repeat q5mins for up three doses within 15 minutes 25 tablet 2   • triamterene-hydrochlorothiazide (MAXZIDE-25) 37.5-25 MG per tablet Take 1 tablet by mouth Daily.     • [DISCONTINUED] aspirin 81 MG EC tablet Take 81 mg by mouth Daily.     • [DISCONTINUED] Misc Natural Products (OSTEO BI-FLEX JOINT SHIELD PO) Take  by mouth 2 (Two) Times a Day.     • [DISCONTINUED] potassium chloride (K-DUR) 10 MEQ CR tablet Take 1 tablet by mouth Daily. 30 tablet 3     No current facility-administered medications on file prior to visit.        ALLERGIES  Plavix [clopidogrel] and Codeine    HISTORY  Past Medical History:   Diagnosis Date   • Diverticulitis    • Hyperlipidemia    • Hypertension        Social History     Socioeconomic History   • Marital status:      Spouse name: Not on file   • Number of children: Not on file   • Years of education: Not on file   • Highest education level: Not on file   Tobacco Use   • Smoking status: Former Smoker     Packs/day: 1.00     Years: 40.00     Pack years: 40.00     Types: Electronic Cigarette     Quit date: 2018     Years since quittin.3   • Smokeless tobacco: Never Used   • Tobacco comment: Still uses E-cigs    Substance and Sexual Activity   • Alcohol use: No     Frequency: Never   • Drug use: No   • Sexual activity: Defer       No family history on file.    Review of Systems   Constitutional: Negative.  Negative for chills, fatigue and fever.    "  HENT: Negative.  Negative for congestion, sinus pressure and sore throat.    Eyes: Positive for visual disturbance (glasses prn).   Respiratory: Positive for shortness of breath. Negative for chest tightness.    Cardiovascular: Negative.  Negative for chest pain, palpitations and leg swelling.   Gastrointestinal: Negative.  Negative for abdominal pain, blood in stool, constipation, diarrhea, nausea and vomiting.   Endocrine: Negative.  Negative for cold intolerance and heat intolerance.   Genitourinary: Negative.  Negative for dysuria, frequency, hematuria and urgency.   Musculoskeletal: Negative.  Negative for arthralgias, back pain and neck pain.   Skin: Negative.  Negative for rash and wound.   Allergic/Immunologic: Negative.  Negative for environmental allergies and food allergies.   Neurological: Negative.  Negative for dizziness, syncope and light-headedness.   Hematological: Negative.  Does not bruise/bleed easily.   Psychiatric/Behavioral: Positive for sleep disturbance (wakes at times with soa, not often, denies cp).       Objective     VITALS: /66 (BP Location: Left arm, Patient Position: Sitting)   Pulse 58   Ht 160 cm (63\")   Wt 62.6 kg (138 lb)   SpO2 99%   BMI 24.45 kg/m²     LABS:   Lab Results (most recent)     None          IMAGING:   No Images in the past 120 days found..    EXAM:  Physical Exam  Vitals signs and nursing note reviewed.   Constitutional:       Appearance: Normal appearance. She is well-developed.   HENT:      Head: Normocephalic and atraumatic.   Eyes:      Pupils: Pupils are equal, round, and reactive to light.   Neck:      Musculoskeletal: Neck supple.      Thyroid: No thyroid mass.      Vascular: No carotid bruit or JVD.      Trachea: Trachea and phonation normal.   Cardiovascular:      Rate and Rhythm: Normal rate and regular rhythm.      Pulses:           Radial pulses are 2+ on the right side and 2+ on the left side.        Posterior tibial pulses are 2+ on the " right side and 2+ on the left side.      Heart sounds: Normal heart sounds. No murmur. No friction rub. No gallop.    Pulmonary:      Effort: Pulmonary effort is normal. No respiratory distress.      Breath sounds: Normal breath sounds. No wheezing or rales.   Abdominal:      General: Bowel sounds are normal. There is no distension or abdominal bruit.      Palpations: Abdomen is soft.      Tenderness: There is no abdominal tenderness.   Musculoskeletal: Normal range of motion.   Skin:     General: Skin is warm and dry.      Capillary Refill: Capillary refill takes less than 2 seconds.      Findings: No rash.   Neurological:      Mental Status: She is alert and oriented to person, place, and time.      Cranial Nerves: No cranial nerve deficit.      Sensory: No sensory deficit.   Psychiatric:         Mood and Affect: Mood normal.         Speech: Speech normal.         Behavior: Behavior normal.         Thought Content: Thought content normal.         Judgment: Judgment normal.         Procedure     ECG 12 Lead    Date/Time: 11/2/2020 11:39 AM  Performed by: Robin Ramirez APRN  Authorized by: Robin Ramirez APRN   Comparison: compared with previous ECG from 5/21/2019  Similar to previous ECG  Rhythm: sinus bradycardia  Rate: normal  BPM: 56  QRS axis: normal  Comments:  ms  No acute change               Assessment/Plan    Diagnosis Plan   1. Shortness of breath  ECG 12 Lead   2. Essential hypertension  ECG 12 Lead   3. Bruising     1.  Patient does have chronic shortness of breath is nonprogressive and unchanged from previous.  She did have a heart cath roughly 1 year ago that had nonobstructive coronary artery disease.  No further intervention or follow-up is needed at this time.  2.  Patient does take aspirin 81 mg on a daily basis however it did cause significant bruising throughout her arms and legs.  She was advised by other provider to take every other day.  She still continue to bruise and she has  completely discontinued the aspirin.  3.  Patient's blood pressure is well controlled on current blood pressure medication regimen.  No medication changes are warranted at this time.  Patient advised to monitor blood pressure on a daily basis and report any persistent highs or lows.  Set goal blood pressure for patient at 130/80 or below.  4.  Informed of signs and symptoms of ACS and advised to seek emergent treatment for any new worsening symptoms.  Patient also advised sooner follow-up as needed.  Also advised to follow-up with family doctor as needed  This note is dictated utilizing voice recognition software.  Although this record has been proof read, transcriptional errors may still be present. If questions occur regarding the content of this record please do not hesitate to call our office.  I have reviewed and confirmed the accuracy of the ROS as documented by the MA/GEETAN/RN SAUL Villalpando    No follow-ups on file.    Diagnoses and all orders for this visit:    1. Shortness of breath (Primary)  -     ECG 12 Lead    2. Essential hypertension  -     ECG 12 Lead    3. Bruising        Kaye Cano  reports that she quit smoking about 2 years ago. Her smoking use included electronic cigarette. She has a 40.00 pack-year smoking history. She has never used smokeless tobacco..         Patient's Body mass index is 24.45 kg/m². BMI is within normal parameters. No follow-up required..    Advance Care Planning   ACP discussion was held with the patient during this visit. Patient has an advance directive (not in EMR), copy requested.         MEDS ORDERED DURING VISIT:  No orders of the defined types were placed in this encounter.          This document has been electronically signed by SAUL Villalpando Jr.  November 2, 2020 13:16 EST

## 2021-05-03 ENCOUNTER — LAB (OUTPATIENT)
Dept: LAB | Facility: HOSPITAL | Age: 81
End: 2021-05-03

## 2021-05-03 ENCOUNTER — DOCUMENTATION (OUTPATIENT)
Dept: CARDIOLOGY | Facility: CLINIC | Age: 81
End: 2021-05-03

## 2021-05-03 ENCOUNTER — OFFICE VISIT (OUTPATIENT)
Dept: CARDIOLOGY | Facility: CLINIC | Age: 81
End: 2021-05-03

## 2021-05-03 VITALS
OXYGEN SATURATION: 99 % | BODY MASS INDEX: 24.27 KG/M2 | HEIGHT: 63 IN | SYSTOLIC BLOOD PRESSURE: 132 MMHG | DIASTOLIC BLOOD PRESSURE: 64 MMHG | HEART RATE: 54 BPM | WEIGHT: 137 LBS

## 2021-05-03 DIAGNOSIS — R79.89 POSITIVE D DIMER: ICD-10-CM

## 2021-05-03 DIAGNOSIS — R06.02 SOB (SHORTNESS OF BREATH): ICD-10-CM

## 2021-05-03 DIAGNOSIS — I10 ESSENTIAL HYPERTENSION: Primary | ICD-10-CM

## 2021-05-03 DIAGNOSIS — R06.02 SHORTNESS OF BREATH: ICD-10-CM

## 2021-05-03 DIAGNOSIS — I10 ESSENTIAL HYPERTENSION: ICD-10-CM

## 2021-05-03 DIAGNOSIS — R07.89 CHEST PAIN, ATYPICAL: ICD-10-CM

## 2021-05-03 DIAGNOSIS — R07.89 CHEST PAIN, ATYPICAL: Primary | ICD-10-CM

## 2021-05-03 DIAGNOSIS — R07.2 PRECORDIAL PAIN: ICD-10-CM

## 2021-05-03 LAB
ANION GAP SERPL CALCULATED.3IONS-SCNC: 12.3 MMOL/L (ref 5–15)
BASOPHILS # BLD AUTO: 0.04 10*3/MM3 (ref 0–0.2)
BASOPHILS NFR BLD AUTO: 0.6 % (ref 0–1.5)
BUN SERPL-MCNC: 14 MG/DL (ref 8–23)
BUN/CREAT SERPL: 16.5 (ref 7–25)
CALCIUM SPEC-SCNC: 10.6 MG/DL (ref 8.6–10.5)
CHLORIDE SERPL-SCNC: 101 MMOL/L (ref 98–107)
CO2 SERPL-SCNC: 26.7 MMOL/L (ref 22–29)
CREAT SERPL-MCNC: 0.85 MG/DL (ref 0.57–1)
D DIMER PPP FEU-MCNC: 1.19 MCGFEU/ML (ref 0–0.5)
DEPRECATED RDW RBC AUTO: 47.8 FL (ref 37–54)
EOSINOPHIL # BLD AUTO: 0.15 10*3/MM3 (ref 0–0.4)
EOSINOPHIL NFR BLD AUTO: 2.1 % (ref 0.3–6.2)
ERYTHROCYTE [DISTWIDTH] IN BLOOD BY AUTOMATED COUNT: 13.8 % (ref 12.3–15.4)
GFR SERPL CREATININE-BSD FRML MDRD: 64 ML/MIN/1.73
GLUCOSE SERPL-MCNC: 94 MG/DL (ref 65–99)
HCT VFR BLD AUTO: 44.8 % (ref 34–46.6)
HGB BLD-MCNC: 13.9 G/DL (ref 12–15.9)
IMM GRANULOCYTES # BLD AUTO: 0.03 10*3/MM3 (ref 0–0.05)
IMM GRANULOCYTES NFR BLD AUTO: 0.4 % (ref 0–0.5)
LYMPHOCYTES # BLD AUTO: 1.99 10*3/MM3 (ref 0.7–3.1)
LYMPHOCYTES NFR BLD AUTO: 27.4 % (ref 19.6–45.3)
MCH RBC QN AUTO: 29.1 PG (ref 26.6–33)
MCHC RBC AUTO-ENTMCNC: 31 G/DL (ref 31.5–35.7)
MCV RBC AUTO: 93.9 FL (ref 79–97)
MONOCYTES # BLD AUTO: 0.77 10*3/MM3 (ref 0.1–0.9)
MONOCYTES NFR BLD AUTO: 10.6 % (ref 5–12)
NEUTROPHILS NFR BLD AUTO: 4.27 10*3/MM3 (ref 1.7–7)
NEUTROPHILS NFR BLD AUTO: 58.9 % (ref 42.7–76)
NRBC BLD AUTO-RTO: 0 /100 WBC (ref 0–0.2)
PLATELET # BLD AUTO: 264 10*3/MM3 (ref 140–450)
PMV BLD AUTO: 9.3 FL (ref 6–12)
POTASSIUM SERPL-SCNC: 4.6 MMOL/L (ref 3.5–5.2)
RBC # BLD AUTO: 4.77 10*6/MM3 (ref 3.77–5.28)
SODIUM SERPL-SCNC: 140 MMOL/L (ref 136–145)
TROPONIN T SERPL-MCNC: <0.01 NG/ML (ref 0–0.03)
TSH SERPL DL<=0.05 MIU/L-ACNC: 1.17 UIU/ML (ref 0.27–4.2)
WBC # BLD AUTO: 7.25 10*3/MM3 (ref 3.4–10.8)

## 2021-05-03 PROCEDURE — 36415 COLL VENOUS BLD VENIPUNCTURE: CPT

## 2021-05-03 PROCEDURE — 85025 COMPLETE CBC W/AUTO DIFF WBC: CPT

## 2021-05-03 PROCEDURE — 84443 ASSAY THYROID STIM HORMONE: CPT

## 2021-05-03 PROCEDURE — 93000 ELECTROCARDIOGRAM COMPLETE: CPT | Performed by: NURSE PRACTITIONER

## 2021-05-03 PROCEDURE — 99214 OFFICE O/P EST MOD 30 MIN: CPT | Performed by: NURSE PRACTITIONER

## 2021-05-03 PROCEDURE — 80048 BASIC METABOLIC PNL TOTAL CA: CPT

## 2021-05-03 PROCEDURE — 84484 ASSAY OF TROPONIN QUANT: CPT

## 2021-05-03 PROCEDURE — 85379 FIBRIN DEGRADATION QUANT: CPT

## 2021-05-03 RX ORDER — NITROGLYCERIN 0.4 MG/1
TABLET SUBLINGUAL
Qty: 25 TABLET | Refills: 2 | Status: SHIPPED | OUTPATIENT
Start: 2021-05-03

## 2021-05-03 RX ORDER — ISOSORBIDE MONONITRATE 30 MG/1
30 TABLET, EXTENDED RELEASE ORAL DAILY
Qty: 30 TABLET | Refills: 11 | Status: SHIPPED | OUTPATIENT
Start: 2021-05-03 | End: 2022-04-25 | Stop reason: SDUPTHER

## 2021-05-03 NOTE — PROGRESS NOTES
Subjective     Kaye Cano is a 80 y.o. female who presents to day for Chest Pain (Chest pain this morning, relieved after second Nitro).    CHIEF COMPLIANT  Chief Complaint   Patient presents with   • Chest Pain     Chest pain this morning, relieved after second Nitro       Active Problems:  Problem List Items Addressed This Visit        Cardiac and Vasculature    Precordial pain    Relevant Medications    nitroglycerin (NITROSTAT) 0.4 MG SL tablet    isosorbide mononitrate (IMDUR) 30 MG 24 hr tablet    Other Relevant Orders    D-dimer, Quantitative    CBC & Differential    Basic Metabolic Panel    TSH    Troponin    Adult Transthoracic Echo Complete W/ Cont if Necessary Per Protocol    Stress Test With Myocardial Perfusion One Day    Essential hypertension - Primary    Relevant Medications    isosorbide mononitrate (IMDUR) 30 MG 24 hr tablet    Other Relevant Orders    D-dimer, Quantitative    CBC & Differential    Basic Metabolic Panel    TSH    Troponin    Adult Transthoracic Echo Complete W/ Cont if Necessary Per Protocol    Stress Test With Myocardial Perfusion One Day       Pulmonary and Pneumonias    Shortness of breath    Relevant Medications    isosorbide mononitrate (IMDUR) 30 MG 24 hr tablet    Other Relevant Orders    D-dimer, Quantitative    CBC & Differential    Basic Metabolic Panel    TSH    Troponin    Adult Transthoracic Echo Complete W/ Cont if Necessary Per Protocol    Stress Test With Myocardial Perfusion One Day      Problem List:     1. Chest Pain  1.1 stress test 6/17/19-anterior wall ischemia, post-rest EF 59%, marked uptake of radiopharmaceutical in the subdiaphragmatic region both post stress and at rest  2.  Palpitations  2.1 event monitor 5/28-6/26/19-9 beat run of nonsustained V. tach, PACs, PVCs  3.  Hypertension  4.  Hyperlipidemia  5.  Diverticulitis  6.  AVMs of the bowel  6.1 EGD/colonoscopy 7/23/19-2 duodenal AVMs ablated, 2 gastric AVMs ablated, 2: AVMs ablated, mild  left-sided diverticulosis  7.  Shortness of breath  7.1 echo 7/24/19-extra systolic beats, EF 60 to 65%, mild MR, mild TR   7.2 9/2019 LHC 30 to 40% disease distally in the LAD no other disease noted     HPI  Ms. Cano is an 80-year-old female patient who is being followed up today for chronic arterial hypertension, and chest pain.  Patient does have chronic arterial hypertension is well controlled on her current blood pressure medication regimen.  Today her blood pressure is 132/64.  She denies any associated symptoms with her chronic arterial hypertension.  However patient does report chest pain that started the other night that is midsternal and occurs intermittently she describes as a heartburn type sensation.  She says it occurs both with rest and activity.  She does have associated diaphoresis.  She says she has taken nitroglycerin on 2 different occasions up to 2 of them which did seem to help.  But she also reports that she drank a Coke and a major belch which also helped.  Patient is having chest pain today and I did advise her to go to the emergency department.  She opted not to go on we will order stat labs.  She does have chronic shortness of air that is unchanged and nonprogressive.  Is usually worse with activity.  It occurs intermittently with activity such as climbing the stairs.  She also reports fatigue where she gets tired easily at times.  Patient denies any lower extremity edema, palpitations, dizziness, lightheadedness, syncope, orthopnea, or other neurological problems.  PRIOR MEDS  Current Outpatient Medications on File Prior to Visit   Medication Sig Dispense Refill   • atorvastatin (LIPITOR) 20 MG tablet Take 20 mg by mouth Every Night.     • triamterene-hydrochlorothiazide (MAXZIDE-25) 37.5-25 MG per tablet Take 1 tablet by mouth Daily.     • [DISCONTINUED] nitroglycerin (NITROSTAT) 0.4 MG SL tablet 1 under the tongue as needed for angina, may repeat q5mins for up three doses within 15  minutes 25 tablet 2     No current facility-administered medications on file prior to visit.       ALLERGIES  Plavix [clopidogrel] and Codeine    HISTORY  Past Medical History:   Diagnosis Date   • Diverticulitis    • Hyperlipidemia    • Hypertension        Social History     Socioeconomic History   • Marital status:      Spouse name: Not on file   • Number of children: Not on file   • Years of education: Not on file   • Highest education level: Not on file   Tobacco Use   • Smoking status: Former Smoker     Packs/day: 1.00     Years: 40.00     Pack years: 40.00     Types: Electronic Cigarette     Quit date: 2018     Years since quittin.8   • Smokeless tobacco: Never Used   • Tobacco comment: Still uses E-cigs    Substance and Sexual Activity   • Alcohol use: No   • Drug use: No   • Sexual activity: Defer       History reviewed. No pertinent family history.    Review of Systems   Constitutional: Positive for fatigue (at times). Negative for chills and fever.   HENT: Negative.  Negative for congestion, sinus pressure and sore throat.    Eyes: Positive for visual disturbance (glasses prn).   Respiratory: Positive for shortness of breath (at times). Negative for chest tightness. Cough: at times.    Cardiovascular: Positive for chest pain (chest pain this morning, took 2 nitor, relieved pain, center of chest, felt like heartburn, drink coke, belching also helped relieve pain). Negative for palpitations and leg swelling.   Gastrointestinal: Negative.  Negative for abdominal pain, blood in stool, constipation, diarrhea, nausea and vomiting.   Endocrine: Negative.  Negative for cold intolerance and heat intolerance.   Genitourinary: Negative.  Negative for dysuria, frequency, hematuria and urgency.   Musculoskeletal: Negative.  Negative for arthralgias, back pain and neck pain.   Skin: Negative.  Negative for rash.   Allergic/Immunologic: Negative.  Negative for environmental allergies and food allergies.  "  Neurological: Negative.  Negative for dizziness, syncope and light-headedness.   Hematological: Bruises/bleeds easily.   Psychiatric/Behavioral: Positive for sleep disturbance (wakes at times with soa, cp and diaphoresis).       Objective     VITALS: /64 (BP Location: Left arm, Patient Position: Sitting)   Pulse 54   Ht 160 cm (63\")   Wt 62.1 kg (137 lb)   SpO2 99%   BMI 24.27 kg/m²     LABS:   Lab Results (most recent)     None          IMAGING:   No Images in the past 120 days found..    EXAM:  Physical Exam  Vitals and nursing note reviewed.   Constitutional:       Appearance: She is well-developed.   HENT:      Head: Normocephalic and atraumatic.   Eyes:      Pupils: Pupils are equal, round, and reactive to light.   Neck:      Thyroid: No thyroid mass.      Vascular: No carotid bruit or JVD.      Trachea: Trachea and phonation normal.   Cardiovascular:      Rate and Rhythm: Normal rate and regular rhythm.      Pulses:           Radial pulses are 2+ on the right side and 2+ on the left side.        Posterior tibial pulses are 2+ on the right side and 2+ on the left side.      Heart sounds: Normal heart sounds. No murmur heard.   No friction rub. No gallop.    Pulmonary:      Effort: Pulmonary effort is normal. No respiratory distress.      Breath sounds: Normal breath sounds. No wheezing or rales.   Abdominal:      General: Bowel sounds are normal. There is no distension or abdominal bruit.      Palpations: Abdomen is soft.      Tenderness: There is no abdominal tenderness.   Musculoskeletal:         General: Normal range of motion.      Cervical back: Neck supple.   Skin:     General: Skin is warm and dry.      Capillary Refill: Capillary refill takes less than 2 seconds.      Findings: No rash.   Neurological:      Mental Status: She is alert and oriented to person, place, and time.      Cranial Nerves: No cranial nerve deficit.      Sensory: No sensory deficit.   Psychiatric:         Speech: " Speech normal.         Behavior: Behavior normal.         Thought Content: Thought content normal.         Judgment: Judgment normal.         Procedure     ECG 12 Lead    Date/Time: 5/3/2021 12:02 PM  Performed by: Robin Ramirez APRN  Authorized by: Robin Ramirez APRN   Comparison: compared with previous ECG from 11/2/2020  Similar to previous ECG  Rhythm: sinus bradycardia  Rate: normal  BPM: 49  QRS axis: normal  Comments:  ms  No acute changes               Assessment/Plan    Diagnosis Plan   1. Essential hypertension  isosorbide mononitrate (IMDUR) 30 MG 24 hr tablet    D-dimer, Quantitative    CBC & Differential    Basic Metabolic Panel    TSH    Troponin    Adult Transthoracic Echo Complete W/ Cont if Necessary Per Protocol    Stress Test With Myocardial Perfusion One Day   2. Precordial pain  nitroglycerin (NITROSTAT) 0.4 MG SL tablet    isosorbide mononitrate (IMDUR) 30 MG 24 hr tablet    D-dimer, Quantitative    CBC & Differential    Basic Metabolic Panel    TSH    Troponin    Adult Transthoracic Echo Complete W/ Cont if Necessary Per Protocol    Stress Test With Myocardial Perfusion One Day   3. Shortness of breath  isosorbide mononitrate (IMDUR) 30 MG 24 hr tablet    D-dimer, Quantitative    CBC & Differential    Basic Metabolic Panel    TSH    Troponin    Adult Transthoracic Echo Complete W/ Cont if Necessary Per Protocol    Stress Test With Myocardial Perfusion One Day   1.  Patient's blood pressure is well controlled on current blood pressure medication regimen.  No medication changes are warranted at this time.  Patient advised to monitor blood pressure on a daily basis and report any persistent highs or lows.  Set goal blood pressure for patient at 130/80 or below.  2.  Patient did have a heart cath in 2019 which show up to 40% disease in the LAD.  She is having active chest pain today and advised to go to the emergency department.  Patient verbalized she related will go to the ER  therefore I will order stat labs for further evaluation.  Her EKG was unchanged from previous EKG.  I will place patient on isosorbide 30 mg daily to see if we can mitigate her chest pain while we repeat a stress test and echocardiogram.  Patient was strongly advised to go to the ER if chest pain reoccurs or got worse.  I will also represcribe her nitroglycerin and follow as previously directed.  3.  Informed of signs and symptoms of ACS and advised to seek emergent treatment for any new worsening symptoms.  Patient also advised sooner follow-up as needed.  Also advised to follow-up with family doctor as needed  This note is dictated utilizing voice recognition software.  Although this record has been proof read, transcriptional errors may still be present. If questions occur regarding the content of this record please do not hesitate to call our office.  I have reviewed and confirmed the accuracy of the ROS as documented by the MA/LPN/RN SAUL Villalpando    Return in about 2 weeks (around 5/17/2021), or if symptoms worsen or fail to improve.    Diagnoses and all orders for this visit:    1. Essential hypertension (Primary)  -     isosorbide mononitrate (IMDUR) 30 MG 24 hr tablet; Take 1 tablet by mouth Daily.  Dispense: 30 tablet; Refill: 11  -     D-dimer, Quantitative; Future  -     CBC & Differential; Future  -     Basic Metabolic Panel; Future  -     TSH; Future  -     Troponin; Future  -     Adult Transthoracic Echo Complete W/ Cont if Necessary Per Protocol; Future  -     Stress Test With Myocardial Perfusion One Day; Future    2. Precordial pain  -     nitroglycerin (NITROSTAT) 0.4 MG SL tablet; 1 under the tongue as needed for angina, may repeat q5mins for up three doses within 15 minutes  Dispense: 25 tablet; Refill: 2  -     isosorbide mononitrate (IMDUR) 30 MG 24 hr tablet; Take 1 tablet by mouth Daily.  Dispense: 30 tablet; Refill: 11  -     D-dimer, Quantitative; Future  -     CBC & Differential;  Future  -     Basic Metabolic Panel; Future  -     TSH; Future  -     Troponin; Future  -     Adult Transthoracic Echo Complete W/ Cont if Necessary Per Protocol; Future  -     Stress Test With Myocardial Perfusion One Day; Future    3. Shortness of breath  -     isosorbide mononitrate (IMDUR) 30 MG 24 hr tablet; Take 1 tablet by mouth Daily.  Dispense: 30 tablet; Refill: 11  -     D-dimer, Quantitative; Future  -     CBC & Differential; Future  -     Basic Metabolic Panel; Future  -     TSH; Future  -     Troponin; Future  -     Adult Transthoracic Echo Complete W/ Cont if Necessary Per Protocol; Future  -     Stress Test With Myocardial Perfusion One Day; Future    Other orders  -     ECG 12 Lead        Kaye Cano  reports that she quit smoking about 2 years ago. Her smoking use included electronic cigarette. She has a 40.00 pack-year smoking history. She has never used smokeless tobacco.         Patient's Body mass index is 24.27 kg/m². BMI is within normal parameters. No follow-up required..    Advance Care Planning   ACP discussion was held with the patient during this visit. Patient does not have an advance directive, declines further assistance.         MEDS ORDERED DURING VISIT:  New Medications Ordered This Visit   Medications   • nitroglycerin (NITROSTAT) 0.4 MG SL tablet     Si under the tongue as needed for angina, may repeat q5mins for up three doses within 15 minutes     Dispense:  25 tablet     Refill:  2   • isosorbide mononitrate (IMDUR) 30 MG 24 hr tablet     Sig: Take 1 tablet by mouth Daily.     Dispense:  30 tablet     Refill:  11           This document has been electronically signed by Robin Ramirez Jr., APRN  May 3, 2021 13:04 EDT

## 2021-05-03 NOTE — PATIENT INSTRUCTIONS

## 2021-05-04 ENCOUNTER — TELEPHONE (OUTPATIENT)
Dept: CARDIOLOGY | Facility: CLINIC | Age: 81
End: 2021-05-04

## 2021-05-04 NOTE — TELEPHONE ENCOUNTER
Patient informed per Enedina Castro PAC/CMA of elevated ddimer, CT today. Korina Dyer LPN      ----- Message from SAUL Villalpando sent at 5/3/2021  9:10 PM EDT -----  Regarding: FW:  Positive DDimer needs stat ct chest  ----- Message -----  From: Lab, Background User  Sent: 5/3/2021   7:29 PM EDT  To: SAUL Villalpando

## 2021-05-05 ENCOUNTER — TELEPHONE (OUTPATIENT)
Dept: CARDIOLOGY | Facility: CLINIC | Age: 81
End: 2021-05-05

## 2021-05-05 NOTE — TELEPHONE ENCOUNTER
Maria M, pt's granddaughter, LVM stating that pt was sent to hospital for imaging and that no one told her what the abnormal lab was or what they were testing for. Stated pt told her to call and get an explanation.  Stated she has some medical background.   #[phone]      Per chart review, pt was sent to hospital for elevated D Dimer to check for possible blockage. Results aren't in yet- I spoke with Shira and she obtained results.   Shira scanned results into pt's chart.- awaiting review.   No release for granddaughter on file.

## 2021-05-21 ENCOUNTER — OFFICE VISIT (OUTPATIENT)
Dept: CARDIOLOGY | Facility: CLINIC | Age: 81
End: 2021-05-21

## 2021-05-21 VITALS
BODY MASS INDEX: 23.92 KG/M2 | DIASTOLIC BLOOD PRESSURE: 67 MMHG | WEIGHT: 135 LBS | HEART RATE: 69 BPM | HEIGHT: 63 IN | OXYGEN SATURATION: 98 % | SYSTOLIC BLOOD PRESSURE: 119 MMHG

## 2021-05-21 DIAGNOSIS — R07.89 ATYPICAL CHEST PAIN: Primary | ICD-10-CM

## 2021-05-21 DIAGNOSIS — R06.02 SHORTNESS OF BREATH: ICD-10-CM

## 2021-05-21 DIAGNOSIS — I47.29 VENTRICULAR TACHYCARDIA (PAROXYSMAL) (HCC): ICD-10-CM

## 2021-05-21 DIAGNOSIS — I10 ESSENTIAL HYPERTENSION: ICD-10-CM

## 2021-05-21 PROCEDURE — 99214 OFFICE O/P EST MOD 30 MIN: CPT | Performed by: NURSE PRACTITIONER

## 2021-06-29 ENCOUNTER — HOSPITAL ENCOUNTER (OUTPATIENT)
Dept: CARDIOLOGY | Facility: HOSPITAL | Age: 81
Discharge: HOME | End: 2021-06-29

## 2021-06-29 ENCOUNTER — HOSPITAL ENCOUNTER (OUTPATIENT)
Dept: CARDIOLOGY | Facility: HOSPITAL | Age: 81
End: 2021-06-29

## 2021-06-29 DIAGNOSIS — R06.02 SHORTNESS OF BREATH: ICD-10-CM

## 2021-06-29 DIAGNOSIS — R07.2 PRECORDIAL PAIN: ICD-10-CM

## 2021-06-29 DIAGNOSIS — I10 ESSENTIAL HYPERTENSION: ICD-10-CM

## 2021-06-29 PROCEDURE — A9500 TC99M SESTAMIBI: HCPCS | Performed by: INTERNAL MEDICINE

## 2021-06-29 PROCEDURE — 78452 HT MUSCLE IMAGE SPECT MULT: CPT

## 2021-06-29 PROCEDURE — 93018 CV STRESS TEST I&R ONLY: CPT | Performed by: INTERNAL MEDICINE

## 2021-06-29 PROCEDURE — 93017 CV STRESS TEST TRACING ONLY: CPT

## 2021-06-29 PROCEDURE — 93306 TTE W/DOPPLER COMPLETE: CPT

## 2021-06-29 PROCEDURE — 78452 HT MUSCLE IMAGE SPECT MULT: CPT | Performed by: INTERNAL MEDICINE

## 2021-06-29 PROCEDURE — 0 TECHNETIUM SESTAMIBI: Performed by: INTERNAL MEDICINE

## 2021-06-29 PROCEDURE — 93306 TTE W/DOPPLER COMPLETE: CPT | Performed by: INTERNAL MEDICINE

## 2021-06-29 RX ADMIN — TECHNETIUM TC 99M SESTAMIBI 1 DOSE: 1 INJECTION INTRAVENOUS at 09:32

## 2021-06-30 ENCOUNTER — HOSPITAL ENCOUNTER (OUTPATIENT)
Dept: CARDIOLOGY | Facility: HOSPITAL | Age: 81
Discharge: HOME | End: 2021-06-30

## 2021-06-30 PROCEDURE — 25010000002 REGADENOSON 0.4 MG/5ML SOLUTION: Performed by: INTERNAL MEDICINE

## 2021-06-30 PROCEDURE — A9500 TC99M SESTAMIBI: HCPCS | Performed by: INTERNAL MEDICINE

## 2021-06-30 PROCEDURE — 0 TECHNETIUM SESTAMIBI: Performed by: INTERNAL MEDICINE

## 2021-06-30 RX ADMIN — TECHNETIUM TC 99M SESTAMIBI 1 DOSE: 1 INJECTION INTRAVENOUS at 08:57

## 2021-06-30 RX ADMIN — REGADENOSON 0.4 MG: 0.08 INJECTION, SOLUTION INTRAVENOUS at 08:57

## 2021-07-03 LAB
BH CV REST NUCLEAR ISOTOPE DOSE: 20 MCI
BH CV STRESS COMMENTS STAGE 1: NORMAL
BH CV STRESS DOSE REGADENOSON STAGE 1: 0.4
BH CV STRESS DURATION MIN STAGE 1: 0
BH CV STRESS DURATION SEC STAGE 1: 10
BH CV STRESS NUCLEAR ISOTOPE DOSE: 20 MCI
BH CV STRESS PROTOCOL 1: NORMAL
BH CV STRESS RECOVERY BP: NORMAL MMHG
BH CV STRESS RECOVERY HR: 76 BPM
BH CV STRESS STAGE 1: 1
MAXIMAL PREDICTED HEART RATE: 138 BPM
PERCENT MAX PREDICTED HR: 69.57 %
STRESS BASELINE BP: NORMAL MMHG
STRESS BASELINE HR: 64 BPM
STRESS PERCENT HR: 82 %
STRESS POST PEAK BP: NORMAL MMHG
STRESS POST PEAK HR: 96 BPM
STRESS TARGET HR: 117 BPM

## 2021-07-05 LAB
BH CV ECHO MEAS - ACS: 1.7 CM
BH CV ECHO MEAS - AO MAX PG: 9.4 MMHG
BH CV ECHO MEAS - AO MEAN PG: 5 MMHG
BH CV ECHO MEAS - AO ROOT AREA (BSA CORRECTED): 1.7
BH CV ECHO MEAS - AO ROOT AREA: 5.9 CM^2
BH CV ECHO MEAS - AO ROOT DIAM: 2.8 CM
BH CV ECHO MEAS - AO V2 MAX: 153 CM/SEC
BH CV ECHO MEAS - AO V2 MEAN: 100 CM/SEC
BH CV ECHO MEAS - AO V2 VTI: 31.9 CM
BH CV ECHO MEAS - BSA(HAYCOCK): 1.6 M^2
BH CV ECHO MEAS - BSA: 1.6 M^2
BH CV ECHO MEAS - BZI_BMI: 24.7 KILOGRAMS/M^2
BH CV ECHO MEAS - BZI_METRIC_HEIGHT: 157.5 CM
BH CV ECHO MEAS - BZI_METRIC_WEIGHT: 61.2 KG
BH CV ECHO MEAS - EDV(CUBED): 57.1 ML
BH CV ECHO MEAS - EDV(MOD-SP4): 39.7 ML
BH CV ECHO MEAS - EDV(TEICH): 63.9 ML
BH CV ECHO MEAS - EF(CUBED): 69.6 %
BH CV ECHO MEAS - EF(MOD-SP4): 61.2 %
BH CV ECHO MEAS - EF(TEICH): 61.9 %
BH CV ECHO MEAS - EF_3D-VOL: 58 %
BH CV ECHO MEAS - ESV(CUBED): 17.4 ML
BH CV ECHO MEAS - ESV(MOD-SP4): 15.4 ML
BH CV ECHO MEAS - ESV(TEICH): 24.4 ML
BH CV ECHO MEAS - FS: 32.7 %
BH CV ECHO MEAS - IVS/LVPW: 0.97
BH CV ECHO MEAS - IVSD: 1 CM
BH CV ECHO MEAS - LA DIMENSION: 2.7 CM
BH CV ECHO MEAS - LA/AO: 0.98
BH CV ECHO MEAS - LAT PEAK E' VEL: 8 CM/SEC
BH CV ECHO MEAS - LV DIASTOLIC VOL/BSA (35-75): 24.5 ML/M^2
BH CV ECHO MEAS - LV IVRT: 0.14 SEC
BH CV ECHO MEAS - LV MASS(C)D: 127.5 GRAMS
BH CV ECHO MEAS - LV MASS(C)DI: 78.8 GRAMS/M^2
BH CV ECHO MEAS - LV SYSTOLIC VOL/BSA (12-30): 9.5 ML/M^2
BH CV ECHO MEAS - LVIDD: 3.9 CM
BH CV ECHO MEAS - LVIDS: 2.6 CM
BH CV ECHO MEAS - LVLD AP4: 7 CM
BH CV ECHO MEAS - LVLS AP4: 6.2 CM
BH CV ECHO MEAS - LVOT AREA (M): 2.8 CM^2
BH CV ECHO MEAS - LVOT AREA: 2.8 CM^2
BH CV ECHO MEAS - LVOT DIAM: 1.9 CM
BH CV ECHO MEAS - LVPWD: 1.1 CM
BH CV ECHO MEAS - MED PEAK E' VEL: 7 CM/SEC
BH CV ECHO MEAS - MV A MAX VEL: 92.7 CM/SEC
BH CV ECHO MEAS - MV DEC SLOPE: 286 CM/SEC^2
BH CV ECHO MEAS - MV E MAX VEL: 81.9 CM/SEC
BH CV ECHO MEAS - MV E/A: 0.88
BH CV ECHO MEAS - PA ACC TIME: 0.1 SEC
BH CV ECHO MEAS - PA PR(ACCEL): 34.5 MMHG
BH CV ECHO MEAS - RAP SYSTOLE: 10 MMHG
BH CV ECHO MEAS - RVDD: 2.7 CM
BH CV ECHO MEAS - SI(AO): 117.1 ML/M^2
BH CV ECHO MEAS - SI(CUBED): 24.5 ML/M^2
BH CV ECHO MEAS - SI(MOD-SP4): 15 ML/M^2
BH CV ECHO MEAS - SI(TEICH): 24.4 ML/M^2
BH CV ECHO MEAS - SV(AO): 189.5 ML
BH CV ECHO MEAS - SV(CUBED): 39.7 ML
BH CV ECHO MEAS - SV(MOD-SP4): 24.3 ML
BH CV ECHO MEAS - SV(TEICH): 39.5 ML
BH CV ECHO MEAS - TAPSE (>1.6): 2.1 CM
BH CV ECHO MEAS - TR MAX VEL: 259 CM/SEC
BH CV ECHO MEASUREMENTS AVERAGE E/E' RATIO: 10.92
MAXIMAL PREDICTED HEART RATE: 138 BPM
STRESS TARGET HR: 117 BPM

## 2021-07-06 ENCOUNTER — TELEPHONE (OUTPATIENT)
Dept: CARDIOLOGY | Facility: CLINIC | Age: 81
End: 2021-07-06

## 2021-07-06 NOTE — TELEPHONE ENCOUNTER
Patient informed of stress test results. She is not available for opening tomorrow with  but is available Thursday. Follow up scheduled with Oumar Guzman. Madie Crabtree MA       ----- Message from SAUL Villalpando sent at 7/4/2021  3:03 PM EDT -----  Regarding: FW:  Positive stress test 1-2 week follow up  ----- Message -----  From: Francis Weeks MD  Sent: 7/3/2021   3:40 PM EDT  To: SAUL Villalpando       Result Text  1.  Scintigraphy is compatible with a moderate sized, moderately dense anteroapical and apical ischemic defect.     2.  Preserved post-rest ejection fraction of 63% with no focal wall motion abnormalities.     3.  No evidence of pharmacologically induced transient ischemic dilation or of increased lung uptake of radiopharmaceutical.

## 2021-07-08 ENCOUNTER — OFFICE VISIT (OUTPATIENT)
Dept: CARDIOLOGY | Facility: CLINIC | Age: 81
End: 2021-07-08

## 2021-07-08 VITALS
OXYGEN SATURATION: 97 % | DIASTOLIC BLOOD PRESSURE: 66 MMHG | BODY MASS INDEX: 24.2 KG/M2 | WEIGHT: 136.6 LBS | HEIGHT: 63 IN | SYSTOLIC BLOOD PRESSURE: 118 MMHG | HEART RATE: 65 BPM

## 2021-07-08 DIAGNOSIS — R07.89 ATYPICAL CHEST PAIN: ICD-10-CM

## 2021-07-08 DIAGNOSIS — R94.39 ABNORMAL NUCLEAR STRESS TEST: ICD-10-CM

## 2021-07-08 DIAGNOSIS — I10 ESSENTIAL HYPERTENSION: ICD-10-CM

## 2021-07-08 DIAGNOSIS — R06.02 SHORTNESS OF BREATH: Primary | ICD-10-CM

## 2021-07-08 PROCEDURE — 99213 OFFICE O/P EST LOW 20 MIN: CPT | Performed by: PHYSICIAN ASSISTANT

## 2021-07-08 NOTE — PATIENT INSTRUCTIONS

## 2021-07-08 NOTE — PROGRESS NOTES
Problem list     Subjective   Kaye Cano is a 80 y.o. female     Chief Complaint   Patient presents with   • Follow-up     ABN testing    Problem List:     1. Chest Pain  1.1 stress test 6/17/19-anterior wall ischemia, post-rest EF 59%, marked uptake of radiopharmaceutical in the subdiaphragmatic region both post stress and at rest  1.2 Follow-up [facility], 2019, suggesting 30 to 40% LAD disease with minor disease otherwise.  2.  Palpitations  2.1 event monitor 5/28-6/26/19-9 beat run of nonsustained V. tach, PACs, PVCs  3.  Hypertension  4.  Hyperlipidemia  5.  Diverticulitis  6.  AVMs of the bowel  6.1 EGD/colonoscopy 7/23/19-2 duodenal AVMs ablated, 2 gastric AVMs ablated, 2: AVMs ablated, mild left-sided diverticulosis      HPI  The patient presents into the clinic today to review stress test findings.  We wanted to see her back to review stress test findings after she did complain of mild dyspnea and symptoms otherwise at last evaluation.  Stress test suggested an anteroapical and apical ischemic defect.  Post-rest EF was at 63%.  The patient also did have an echo which indicated preserved systolic function with no significant structural abnormalities noted otherwise.  Clinically, the patient reports that she is doing very well from general cardiovascular standpoint.  Currently, she reports no chest pain.  She has what she reports is stable dyspnea and fatigue.  Exercise capacity continues to be adequate and without any significant limitation from cardiovascular standpoint.  She denies failure or dysrhythmic symptoms.  She remains normotensive when checked at home.  She has no further complaints at this time.    Current Outpatient Medications on File Prior to Visit   Medication Sig Dispense Refill   • atorvastatin (LIPITOR) 20 MG tablet Take 20 mg by mouth Every Night.     • isosorbide mononitrate (IMDUR) 30 MG 24 hr tablet Take 1 tablet by mouth Daily. 30 tablet 11   • nitroglycerin (NITROSTAT) 0.4 MG SL tablet 1  under the tongue as needed for angina, may repeat q5mins for up three doses within 15 minutes 25 tablet 2     No current facility-administered medications on file prior to visit.       Plavix [clopidogrel] and Codeine    Past Medical History:   Diagnosis Date   • COVID-19 vaccine administered    • Diverticulitis    • Hyperlipidemia    • Hypertension        Social History     Socioeconomic History   • Marital status:      Spouse name: Not on file   • Number of children: Not on file   • Years of education: Not on file   • Highest education level: Not on file   Tobacco Use   • Smoking status: Former Smoker     Packs/day: 1.00     Years: 40.00     Pack years: 40.00     Types: Electronic Cigarette     Quit date: 07/2018     Years since quitting: 3.0   • Smokeless tobacco: Never Used   • Tobacco comment: Still uses E-cigs    Substance and Sexual Activity   • Alcohol use: No   • Drug use: No   • Sexual activity: Defer       Family History   Problem Relation Age of Onset   • No Known Problems Mother    • No Known Problems Father        Review of Systems   Constitutional: Negative.  Negative for chills, fatigue and fever.   HENT: Negative.  Negative for congestion, rhinorrhea and sore throat.    Eyes: Negative.  Negative for visual disturbance.   Respiratory: Negative.  Negative for chest tightness, shortness of breath and wheezing.    Cardiovascular: Negative.  Negative for chest pain, palpitations and leg swelling.   Gastrointestinal: Negative.    Endocrine: Negative.    Genitourinary: Negative.    Musculoskeletal: Positive for arthralgias and back pain. Negative for neck pain.   Skin: Negative.  Negative for rash and wound.   Allergic/Immunologic: Positive for environmental allergies.   Neurological: Negative.  Negative for dizziness, weakness, numbness and headaches.   Hematological: Bruises/bleeds easily (bruises/ bleeds).   Psychiatric/Behavioral: Negative.  Negative for sleep disturbance.       Objective    "  Vitals:    07/08/21 1118   BP: 118/66   BP Location: Left arm   Patient Position: Sitting   Pulse: 65   SpO2: 97%   Weight: 62 kg (136 lb 9.6 oz)   Height: 160 cm (63\")      /66 (BP Location: Left arm, Patient Position: Sitting)   Pulse 65   Ht 160 cm (63\")   Wt 62 kg (136 lb 9.6 oz)   SpO2 97%   BMI 24.20 kg/m²    Lab Results (most recent)     None        Physical Exam  Vitals and nursing note reviewed.   Constitutional:       General: She is not in acute distress.     Appearance: She is well-developed.   HENT:      Head: Normocephalic and atraumatic.   Eyes:      Conjunctiva/sclera: Conjunctivae normal.      Pupils: Pupils are equal, round, and reactive to light.   Neck:      Vascular: No JVD.      Trachea: No tracheal deviation.   Cardiovascular:      Rate and Rhythm: Normal rate and regular rhythm.      Heart sounds: Normal heart sounds.   Pulmonary:      Effort: Pulmonary effort is normal.      Breath sounds: Normal breath sounds.   Abdominal:      General: Bowel sounds are normal. There is no distension.      Palpations: Abdomen is soft. There is no mass.      Tenderness: There is no abdominal tenderness. There is no guarding or rebound.   Musculoskeletal:         General: No tenderness or deformity. Normal range of motion.      Cervical back: Normal range of motion and neck supple.   Skin:     General: Skin is warm and dry.      Coloration: Skin is not pale.      Findings: No erythema or rash.   Neurological:      Mental Status: She is alert and oriented to person, place, and time.   Psychiatric:         Behavior: Behavior normal.         Thought Content: Thought content normal.         Judgment: Judgment normal.           Procedure   Procedures       Assessment/Plan      Diagnosis Plan   1. Shortness of breath     2. Atypical chest pain     3. Essential hypertension     4. Abnormal nuclear stress test       1.  The patient presents back to the clinic today to discuss stress test findings at our " request.  She was scheduled for a stress test for what is reported as atypical chest discomfort, dyspnea, and symptoms otherwise.  Symptomatically, she tells me that she has no chest discomfort at this time, without resolving shortly after her last evaluation.  Her dyspnea is very much stable.  She has no cardiovascular symptoms otherwise.    2.  Stress test supported anteroapical and apical ischemia.  We did review options for consideration of catheterization.  She feels well at this time and would prefer not to pursue that unless she were to develop further abnormalities in the future.  As there are no high risk markers by nuclear stress test, particularly given stable clinical course at this time, I feel that is feasible that we can treat her medically and follow her clinically.  For recurrent symptoms, she would need consideration for catheterization at that time.    3.  We did review her previous catheterization from just a couple of years ago.  That suggested mostly minor nonobstructive CAD.  I did review that with her as well today.  Again, as she is clinically stable and doing well otherwise, nothing further is felt indicated at this time.  We will follow with her routinely through the clinic.  For continued symptoms are certainly progressive symptoms of any nature, she will call immediately and we would consider catheterization at that time.                  Advance Care Planning   ACP discussion was held with the patient during this visit. Patient does not have an advance directive, declines further assistance.      Electronically signed by:

## 2021-08-24 ENCOUNTER — OFFICE VISIT (OUTPATIENT)
Dept: CARDIOLOGY | Facility: CLINIC | Age: 81
End: 2021-08-24

## 2021-08-24 VITALS
DIASTOLIC BLOOD PRESSURE: 67 MMHG | WEIGHT: 138 LBS | SYSTOLIC BLOOD PRESSURE: 117 MMHG | HEIGHT: 63 IN | BODY MASS INDEX: 24.45 KG/M2 | HEART RATE: 70 BPM | OXYGEN SATURATION: 94 %

## 2021-08-24 DIAGNOSIS — I25.10 CORONARY ARTERY DISEASE INVOLVING NATIVE CORONARY ARTERY OF NATIVE HEART WITHOUT ANGINA PECTORIS: Primary | ICD-10-CM

## 2021-08-24 DIAGNOSIS — I10 ESSENTIAL HYPERTENSION: ICD-10-CM

## 2021-08-24 DIAGNOSIS — E78.5 DYSLIPIDEMIA: ICD-10-CM

## 2021-08-24 PROCEDURE — 99213 OFFICE O/P EST LOW 20 MIN: CPT | Performed by: PHYSICIAN ASSISTANT

## 2021-08-24 RX ORDER — TRIAMTERENE AND HYDROCHLOROTHIAZIDE 37.5; 25 MG/1; MG/1
1 TABLET ORAL DAILY
COMMUNITY

## 2021-08-24 NOTE — PATIENT INSTRUCTIONS

## 2021-08-24 NOTE — PROGRESS NOTES
Problem list     Subjective   Kaye Cano is a 81 y.o. female     Chief Complaint   Patient presents with   • One month follow up   Problem List:     1. Chest Pain  1.1 stress test 6/17/19-anterior wall ischemia, post-rest EF 59%, marked uptake of radiopharmaceutical in the subdiaphragmatic region both post stress and at rest  1.2 Follow-up [facility], 2019, suggesting 30 to 40% LAD disease with minor disease otherwise.  2.  Palpitations  2.1 event monitor 5/28-6/26/19-9 beat run of nonsustained V. tach, PACs, PVCs  3.  Hypertension  4.  Hyperlipidemia  5.  Diverticulitis  6.  AVMs of the bowel  6.1 EGD/colonoscopy 7/23/19-2 duodenal AVMs ablated, 2 gastric AVMs ablated, 2: AVMs ablated, mild left-sided diverticulosis       HPI    Patient is a 81-year-old female who presents to the office for evaluation.  She has done remarkably well since her last visit here.  She does not describe any chest pain or pressure.  She does not describe any shortness of breath.  No complaints of PND orthopnea.    She does not palpitated of dysrhythmic symptoms.  Overall she is feeling remarkably well at this time.    As stated previously, she has history of nonobstructive coronary disease with anterior ischemia noted by stress test in 2019.  Patient does not describe any ischemic symptoms at this point and has been treated medically        Current Outpatient Medications on File Prior to Visit   Medication Sig Dispense Refill   • atorvastatin (LIPITOR) 20 MG tablet Take 20 mg by mouth Every Night.     • isosorbide mononitrate (IMDUR) 30 MG 24 hr tablet Take 1 tablet by mouth Daily. 30 tablet 11   • nitroglycerin (NITROSTAT) 0.4 MG SL tablet 1 under the tongue as needed for angina, may repeat q5mins for up three doses within 15 minutes 25 tablet 2   • triamterene-hydrochlorothiazide (MAXZIDE-25) 37.5-25 MG per tablet Take 1 tablet by mouth Daily.       No current facility-administered medications on file prior to visit.       Plavix  [clopidogrel] and Codeine    Past Medical History:   Diagnosis Date   • COVID-19 vaccine administered    • Diverticulitis    • Hyperlipidemia    • Hypertension        Social History     Socioeconomic History   • Marital status:      Spouse name: Not on file   • Number of children: Not on file   • Years of education: Not on file   • Highest education level: Not on file   Tobacco Use   • Smoking status: Former Smoker     Packs/day: 1.00     Years: 40.00     Pack years: 40.00     Types: Electronic Cigarette     Quit date: 07/2018     Years since quitting: 3.1   • Smokeless tobacco: Never Used   • Tobacco comment: Still uses E-cigs    Substance and Sexual Activity   • Alcohol use: No   • Drug use: No   • Sexual activity: Defer       Family History   Problem Relation Age of Onset   • No Known Problems Mother    • No Known Problems Father        Review of Systems   Constitutional: Negative.  Negative for chills, fatigue and fever.   HENT: Negative.  Negative for congestion, sinus pressure and sore throat.    Eyes: Positive for visual disturbance (glasses prn).   Respiratory: Negative.  Negative for chest tightness and shortness of breath.    Cardiovascular: Negative.  Negative for chest pain, palpitations and leg swelling.   Gastrointestinal: Negative.  Negative for abdominal pain, blood in stool, constipation, diarrhea, nausea and vomiting.   Endocrine: Negative.  Negative for cold intolerance and heat intolerance.   Genitourinary: Negative.  Negative for dysuria, frequency, hematuria and urgency.   Musculoskeletal: Negative.  Negative for arthralgias, back pain and neck pain.   Skin: Positive for wound (bruises).   Allergic/Immunologic: Positive for environmental allergies. Negative for food allergies.   Neurological: Negative.  Negative for dizziness, syncope and light-headedness.   Hematological: Bruises/bleeds easily.   Psychiatric/Behavioral: Negative.  Negative for sleep disturbance (denies waking with soa  "or cp).       Objective   Vitals:    08/24/21 1551   BP: 117/67   BP Location: Left arm   Patient Position: Sitting   Pulse: 70   SpO2: 94%   Weight: 62.6 kg (138 lb)   Height: 160 cm (63\")      /67 (BP Location: Left arm, Patient Position: Sitting)   Pulse 70   Ht 160 cm (63\")   Wt 62.6 kg (138 lb)   SpO2 94%   BMI 24.45 kg/m²     Lab Results (most recent)     None          Physical Exam  Vitals and nursing note reviewed.   Constitutional:       General: She is not in acute distress.     Appearance: Normal appearance. She is well-developed.   HENT:      Head: Normocephalic and atraumatic.   Eyes:      General: No scleral icterus.        Right eye: No discharge.         Left eye: No discharge.      Conjunctiva/sclera: Conjunctivae normal.   Neck:      Vascular: No carotid bruit.   Cardiovascular:      Rate and Rhythm: Normal rate and regular rhythm.      Heart sounds: Normal heart sounds. No murmur heard.   No friction rub. No gallop.    Pulmonary:      Effort: Pulmonary effort is normal. No respiratory distress.      Breath sounds: Normal breath sounds. No wheezing or rales.   Chest:      Chest wall: No tenderness.   Musculoskeletal:      Right lower leg: No edema.      Left lower leg: No edema.   Skin:     General: Skin is warm and dry.      Coloration: Skin is not pale.      Findings: No erythema or rash.   Neurological:      Mental Status: She is alert and oriented to person, place, and time.      Cranial Nerves: No cranial nerve deficit.   Psychiatric:         Behavior: Behavior normal.         Procedure   Procedures       Assessment/Plan     Problems Addressed this Visit        Cardiac and Vasculature    Essential hypertension    Relevant Medications    triamterene-hydrochlorothiazide (MAXZIDE-25) 37.5-25 MG per tablet    Dyslipidemia    Coronary artery disease involving native coronary artery of native heart without angina pectoris - Primary      Diagnoses       Codes Comments    Coronary artery " disease involving native coronary artery of native heart without angina pectoris    -  Primary ICD-10-CM: I25.10  ICD-9-CM: 414.01     Essential hypertension     ICD-10-CM: I10  ICD-9-CM: 401.9     Dyslipidemia     ICD-10-CM: E78.5  ICD-9-CM: 272.4         Recommendation  1.  Patient is an 81-year-old female that is doing remarkably well with no ischemic symptoms.    2.  For now we will continue medical therapy.  I do recommend her taking aspirin daily.  She describes being on that medication.  She is on statin therapy    3.  Labs are being monitored by primary.  Otherwise she is doing well we will see her back for follow-up in 6 months.  Follow-up with primary as scheduled      Patient brought in medicine list to appointment, it's been reviewed with patient and med list was updated in the chart.     Advance Care Planning   ACP discussion was held with the patient during this visit. Patient does not have an advance directive, declines further assistance.         Electronically signed by:

## 2021-08-25 PROBLEM — E78.5 DYSLIPIDEMIA: Status: ACTIVE | Noted: 2021-08-25

## 2021-08-25 PROBLEM — I25.10 CORONARY ARTERY DISEASE INVOLVING NATIVE CORONARY ARTERY OF NATIVE HEART WITHOUT ANGINA PECTORIS: Status: ACTIVE | Noted: 2021-08-25

## 2022-03-29 ENCOUNTER — OFFICE VISIT (OUTPATIENT)
Dept: CARDIOLOGY | Facility: CLINIC | Age: 82
End: 2022-03-29

## 2022-03-29 VITALS
WEIGHT: 136 LBS | BODY MASS INDEX: 24.1 KG/M2 | HEIGHT: 63 IN | DIASTOLIC BLOOD PRESSURE: 65 MMHG | HEART RATE: 62 BPM | SYSTOLIC BLOOD PRESSURE: 128 MMHG | OXYGEN SATURATION: 96 %

## 2022-03-29 DIAGNOSIS — E78.5 DYSLIPIDEMIA: ICD-10-CM

## 2022-03-29 DIAGNOSIS — I10 ESSENTIAL HYPERTENSION: ICD-10-CM

## 2022-03-29 DIAGNOSIS — I25.10 CORONARY ARTERY DISEASE INVOLVING NATIVE CORONARY ARTERY OF NATIVE HEART WITHOUT ANGINA PECTORIS: Primary | ICD-10-CM

## 2022-03-29 PROCEDURE — 99213 OFFICE O/P EST LOW 20 MIN: CPT | Performed by: PHYSICIAN ASSISTANT

## 2022-03-29 NOTE — PROGRESS NOTES
Problem list     Subjective   Kaye Cano is a 81 y.o. female     Chief Complaint   Patient presents with   • Coronary Artery Disease   Problem List:     1. Chest Pain  1.1 stress test 6/17/19-anterior wall ischemia, post-rest EF 59%, marked uptake of radiopharmaceutical in the subdiaphragmatic region both post stress and at rest  1.2 Follow-up [facility], 2019, suggesting 30 to 40% LAD disease with minor disease otherwise.  2.  Palpitations  2.1 event monitor 5/28-6/26/19-9 beat run of nonsustained V. tach, PACs, PVCs  3.  Hypertension  4.  Hyperlipidemia  5.  Diverticulitis  6.  AVMs of the bowel  6.1 EGD/colonoscopy 7/23/19-2 duodenal AVMs ablated, 2 gastric AVMs ablated, 2: AVMs ablated, mild left-sided diverticulosis    HPI    Patient is a 81 year-old female that presents to the office for routine assessment follow-up.  She has done remarkably well since her last visit.  She has no complaints of chest pain or pressure.  No shortness of breath.  No PND or orthopnea    She does not describe palpitating having dysrhythmic symptoms.  Otherwise she is done remarkably well    Current Outpatient Medications on File Prior to Visit   Medication Sig Dispense Refill   • atorvastatin (LIPITOR) 20 MG tablet Take 20 mg by mouth Every Night.     • isosorbide mononitrate (IMDUR) 30 MG 24 hr tablet Take 1 tablet by mouth Daily. 30 tablet 11   • nitroglycerin (NITROSTAT) 0.4 MG SL tablet 1 under the tongue as needed for angina, may repeat q5mins for up three doses within 15 minutes 25 tablet 2   • triamterene-hydrochlorothiazide (MAXZIDE-25) 37.5-25 MG per tablet Take 1 tablet by mouth Daily.       No current facility-administered medications on file prior to visit.       Plavix [clopidogrel] and Codeine    Past Medical History:   Diagnosis Date   • COVID-19 vaccine administered    • Diverticulitis    • Hyperlipidemia    • Hypertension        Social History     Socioeconomic History   • Marital status:    Tobacco Use   •  "Smoking status: Former Smoker     Packs/day: 1.00     Years: 40.00     Pack years: 40.00     Types: Electronic Cigarette     Quit date: 07/2018     Years since quitting: 3.7   • Smokeless tobacco: Never Used   • Tobacco comment: Still uses E-cigs    Substance and Sexual Activity   • Alcohol use: No   • Drug use: No   • Sexual activity: Defer       Family History   Problem Relation Age of Onset   • No Known Problems Mother    • No Known Problems Father        Review of Systems   Constitutional: Negative.  Negative for chills and fever.   Respiratory: Negative.  Negative for chest tightness and shortness of breath.    Cardiovascular: Negative.  Negative for chest pain, palpitations and leg swelling.   Gastrointestinal: Negative.  Negative for blood in stool.   Endocrine: Negative.  Negative for cold intolerance and heat intolerance.   Genitourinary: Negative.  Negative for hematuria.   Musculoskeletal: Negative.  Negative for back pain and neck pain.   Neurological: Negative.  Negative for dizziness, syncope and light-headedness.   Psychiatric/Behavioral: Negative.  Negative for sleep disturbance (denies waking with soa or cp).       Objective   Vitals:    03/29/22 0904   BP: 128/65   BP Location: Left arm   Patient Position: Sitting   Pulse: 62   SpO2: 96%   Weight: 61.7 kg (136 lb)   Height: 160 cm (63\")      /65 (BP Location: Left arm, Patient Position: Sitting)   Pulse 62   Ht 160 cm (63\")   Wt 61.7 kg (136 lb)   SpO2 96%   BMI 24.09 kg/m²     Lab Results (most recent)     None          Physical Exam  Vitals and nursing note reviewed.   Constitutional:       General: She is not in acute distress.     Appearance: Normal appearance. She is well-developed.   HENT:      Head: Normocephalic and atraumatic.   Eyes:      General: No scleral icterus.        Right eye: No discharge.         Left eye: No discharge.      Conjunctiva/sclera: Conjunctivae normal.   Neck:      Vascular: No carotid bruit. "   Cardiovascular:      Rate and Rhythm: Normal rate and regular rhythm.      Heart sounds: Normal heart sounds. No murmur heard.    No friction rub. No gallop.   Pulmonary:      Effort: Pulmonary effort is normal. No respiratory distress.      Breath sounds: Normal breath sounds. No wheezing or rales.   Chest:      Chest wall: No tenderness.   Musculoskeletal:      Right lower leg: No edema.      Left lower leg: No edema.   Skin:     General: Skin is warm and dry.      Coloration: Skin is not pale.      Findings: No erythema or rash.   Neurological:      Mental Status: She is alert and oriented to person, place, and time.      Cranial Nerves: No cranial nerve deficit.   Psychiatric:         Behavior: Behavior normal.         Procedure   Procedures       Assessment/Plan     Problems Addressed this Visit        Cardiac and Vasculature    Essential hypertension    Dyslipidemia    Coronary artery disease involving native coronary artery of native heart without angina pectoris - Primary      Diagnoses       Codes Comments    Coronary artery disease involving native coronary artery of native heart without angina pectoris    -  Primary ICD-10-CM: I25.10  ICD-9-CM: 414.01     Essential hypertension     ICD-10-CM: I10  ICD-9-CM: 401.9     Dyslipidemia     ICD-10-CM: E78.5  ICD-9-CM: 272.4         Recommendation  1.  Patient is a 81-year-old female who presents to the office for evaluation with coronary disease.  She has done relatively well on appropriate medical therapy.  For now we will continue to monitor    2.  Baseline hypertension noted but she is doing well on medical therapy.  For now we will monitor    3.  Patient with dyslipidemia currently on statin therapy.  Labs have been routinely monitored and we will continue statin therapy.  We will see her back for follow-up in 6 months to year.  Follow-up with primary as scheduled           Patient did not bring med list or medicine bottles to appointment, med list has  been reviewed and updated based on patient's knowledge of their meds.     Advance Care Planning   ACP discussion was held with the patient during this visit. Patient does not have an advance directive, declines further assistance.         Electronically signed by:

## 2022-04-25 ENCOUNTER — TELEPHONE (OUTPATIENT)
Dept: CARDIOLOGY | Facility: CLINIC | Age: 82
End: 2022-04-25

## 2022-04-25 DIAGNOSIS — R06.02 SHORTNESS OF BREATH: ICD-10-CM

## 2022-04-25 DIAGNOSIS — R07.2 PRECORDIAL PAIN: ICD-10-CM

## 2022-04-25 DIAGNOSIS — I10 ESSENTIAL HYPERTENSION: ICD-10-CM

## 2022-04-25 RX ORDER — ISOSORBIDE MONONITRATE 30 MG/1
30 TABLET, EXTENDED RELEASE ORAL DAILY
Qty: 30 TABLET | Refills: 11 | Status: SHIPPED | OUTPATIENT
Start: 2022-04-25

## 2023-03-29 ENCOUNTER — OFFICE VISIT (OUTPATIENT)
Dept: CARDIOLOGY | Facility: CLINIC | Age: 83
End: 2023-03-29
Payer: MEDICARE

## 2023-03-29 VITALS
HEART RATE: 51 BPM | WEIGHT: 140.2 LBS | OXYGEN SATURATION: 95 % | HEIGHT: 63 IN | DIASTOLIC BLOOD PRESSURE: 66 MMHG | BODY MASS INDEX: 24.84 KG/M2 | SYSTOLIC BLOOD PRESSURE: 134 MMHG

## 2023-03-29 DIAGNOSIS — R00.2 PALPITATIONS: ICD-10-CM

## 2023-03-29 DIAGNOSIS — I25.10 CORONARY ARTERY DISEASE INVOLVING NATIVE CORONARY ARTERY OF NATIVE HEART WITHOUT ANGINA PECTORIS: Primary | ICD-10-CM

## 2023-03-29 DIAGNOSIS — E78.5 DYSLIPIDEMIA: ICD-10-CM

## 2023-03-29 PROCEDURE — 93000 ELECTROCARDIOGRAM COMPLETE: CPT | Performed by: PHYSICIAN ASSISTANT

## 2023-03-29 PROCEDURE — 3075F SYST BP GE 130 - 139MM HG: CPT | Performed by: PHYSICIAN ASSISTANT

## 2023-03-29 PROCEDURE — 99213 OFFICE O/P EST LOW 20 MIN: CPT | Performed by: PHYSICIAN ASSISTANT

## 2023-03-29 PROCEDURE — 3078F DIAST BP <80 MM HG: CPT | Performed by: PHYSICIAN ASSISTANT

## 2023-03-29 NOTE — PROGRESS NOTES
Problem list     Subjective   Kaye Cano is a 82 y.o. female     Chief Complaint   Patient presents with   • Follow-up     YEARLY F/U    Problem List:     1. Chest Pain  1.1 stress test 6/17/19-anterior wall ischemia, post-rest EF 59%, marked uptake of radiopharmaceutical in the subdiaphragmatic region both post stress and at rest  1.2 Follow-up [facility], 2019, suggesting 30 to 40% LAD disease with minor disease otherwise.  2.  Palpitations  2.1 event monitor 5/28-6/26/19-9 beat run of nonsustained V. tach, PACs, PVCs  3.  Hypertension  4.  Hyperlipidemia  5.  Diverticulitis  6.  AVMs of the bowel  6.1 EGD/colonoscopy 7/23/19-2 duodenal AVMs ablated, 2 gastric AVMs ablated, 2: AVMs ablated, mild left-sided diverticulosis    HPI    Patient is an 82-year-old female who presents back to the office for routine follow-up.  She has done remarkably well since her last visit.  She has no chest pain or pressure at all.  She has mild amount of exertional dyspnea but nothing that has been progressive.  She does not describe PND orthopnea.  No complaints of palpitations or dysrhythmic symptoms.    Overall, her functional status appears to be normal.  She has moderate nonobstructive coronary disease in the past but catheterization in 2019.  Otherwise, patient is stable.      Current Outpatient Medications on File Prior to Visit   Medication Sig Dispense Refill   • atorvastatin (LIPITOR) 20 MG tablet Take 1 tablet by mouth Every Night.     • isosorbide mononitrate (IMDUR) 30 MG 24 hr tablet Take 1 tablet by mouth Daily. 30 tablet 11   • nitroglycerin (NITROSTAT) 0.4 MG SL tablet 1 under the tongue as needed for angina, may repeat q5mins for up three doses within 15 minutes 25 tablet 2   • triamterene-hydrochlorothiazide (MAXZIDE-25) 37.5-25 MG per tablet Take 1 tablet by mouth Daily.       No current facility-administered medications on file prior to visit.       Plavix [clopidogrel] and Codeine    Past Medical History:   Diagnosis  "Date   • COVID-19 vaccine administered    • Diverticulitis    • Hyperlipidemia    • Hypertension        Social History     Socioeconomic History   • Marital status:    Tobacco Use   • Smoking status: Former     Packs/day: 1.00     Years: 40.00     Pack years: 40.00     Types: Electronic Cigarette, Cigarettes     Quit date: 2018     Years since quittin.7   • Smokeless tobacco: Never   • Tobacco comments:     Still uses E-cigs    Substance and Sexual Activity   • Alcohol use: No   • Drug use: No   • Sexual activity: Defer       Family History   Problem Relation Age of Onset   • No Known Problems Mother    • No Known Problems Father        Review of Systems   Constitutional: Negative for appetite change, chills and fever.   HENT: Negative.  Negative for dental problem, drooling, ear discharge, ear pain, facial swelling, sinus pressure, sinus pain, sneezing, sore throat and tinnitus.    Eyes: Negative for pain, redness, itching and visual disturbance.   Respiratory: Negative for cough, choking and wheezing.    Cardiovascular: Negative for chest pain, palpitations and leg swelling.   Gastrointestinal: Negative for blood in stool, constipation, diarrhea and nausea.   Genitourinary: Negative.  Negative for difficulty urinating.   Musculoskeletal: Positive for arthralgias. Negative for back pain and neck pain.   Skin: Negative for rash and wound.   Neurological: Negative for dizziness, weakness and numbness.   Psychiatric/Behavioral: Negative for sleep disturbance.       Objective   Vitals:    23 0847   BP: 134/66   Pulse: 51   SpO2: 95%   Weight: 63.6 kg (140 lb 3.2 oz)   Height: 160 cm (62.99\")      /66   Pulse 51   Ht 160 cm (62.99\")   Wt 63.6 kg (140 lb 3.2 oz)   SpO2 95%   BMI 24.84 kg/m²     Lab Results (most recent)     None          Physical Exam  Vitals and nursing note reviewed.   Constitutional:       General: She is not in acute distress.     Appearance: Normal appearance. She is " well-developed.   HENT:      Head: Normocephalic and atraumatic.   Eyes:      General: No scleral icterus.        Right eye: No discharge.         Left eye: No discharge.      Conjunctiva/sclera: Conjunctivae normal.   Neck:      Vascular: No carotid bruit.   Cardiovascular:      Rate and Rhythm: Normal rate and regular rhythm.      Heart sounds: Normal heart sounds. No murmur heard.    No friction rub. No gallop.   Pulmonary:      Effort: Pulmonary effort is normal. No respiratory distress.      Breath sounds: Normal breath sounds. No wheezing or rales.   Chest:      Chest wall: No tenderness.   Musculoskeletal:      Right lower leg: No edema.      Left lower leg: No edema.   Skin:     General: Skin is warm and dry.      Coloration: Skin is not pale.      Findings: No erythema or rash.   Neurological:      Mental Status: She is alert and oriented to person, place, and time.      Cranial Nerves: No cranial nerve deficit.   Psychiatric:         Behavior: Behavior normal.         Procedure     ECG 12 Lead    Date/Time: 3/29/2023 8:49 AM  Performed by: Vic Mota PA  Authorized by: Vic Mota PA   Comparison: compared with previous ECG from 5/3/2021  Comments: EKG demonstrates sinus bradycardia 59 bpm, normal axis, possible septal MI age-indeterminate and no acute ST changes               Assessment & Plan     Problems Addressed this Visit        Cardiac and Vasculature    Palpitations    Dyslipidemia    Coronary artery disease involving native coronary artery of native heart without angina pectoris - Primary   Diagnoses       Codes Comments    Coronary artery disease involving native coronary artery of native heart without angina pectoris    -  Primary ICD-10-CM: I25.10  ICD-9-CM: 414.01     Dyslipidemia     ICD-10-CM: E78.5  ICD-9-CM: 272.4     Palpitations     ICD-10-CM: R00.2  ICD-9-CM: 785.1           Recommendation  1.  Patient is a 82-year-old female who presents back to the office to be  evaluated.  She has history of nonobstructive coronary disease on statin therapy.  She follows with primary.  She has felt well.  For now, we will make no changes.    2.  I will continue Lipitor in regards to lipid management.  We will await next lipid parameters and continue to monitor.    3.  Patient with palpitations or at least an arrhythmia in the past.  She does not describe any significant palpitation issues recently.  No symptoms to suggest malignant arrhythmia.  For now, we can monitor.  She has history of PACs and PVCs but is not symptomatic.  We will see her back for follow-up in 6 months or 2-year or sooner as symptoms discussed.  She is to follow with primary as scheduled.         Kaye Cano  reports that she quit smoking about 4 years ago. Her smoking use included electronic cigarette and cigarettes. She has a 40.00 pack-year smoking history. She has never used smokeless tobacco..          Advance Care Planning   ACP discussion was declined by the patient. Patient does not have an advance directive, declines further assistance.     Electronically signed by:

## 2023-04-16 DIAGNOSIS — I10 ESSENTIAL HYPERTENSION: ICD-10-CM

## 2023-04-16 DIAGNOSIS — R07.2 PRECORDIAL PAIN: ICD-10-CM

## 2023-04-16 DIAGNOSIS — R06.02 SHORTNESS OF BREATH: ICD-10-CM

## 2023-04-17 RX ORDER — ISOSORBIDE MONONITRATE 30 MG/1
TABLET, EXTENDED RELEASE ORAL
Qty: 30 TABLET | Refills: 11 | Status: SHIPPED | OUTPATIENT
Start: 2023-04-17